# Patient Record
Sex: MALE | Race: WHITE | NOT HISPANIC OR LATINO | URBAN - METROPOLITAN AREA
[De-identification: names, ages, dates, MRNs, and addresses within clinical notes are randomized per-mention and may not be internally consistent; named-entity substitution may affect disease eponyms.]

---

## 2018-03-13 ENCOUNTER — INPATIENT (INPATIENT)
Facility: HOSPITAL | Age: 83
LOS: 2 days | Discharge: NURSING, REHAB OR LONG-TERM CARE | End: 2018-03-16
Attending: INTERNAL MEDICINE | Admitting: INTERNAL MEDICINE
Payer: MEDICARE

## 2018-03-13 VITALS
OXYGEN SATURATION: 96 % | HEART RATE: 73 BPM | RESPIRATION RATE: 18 BRPM | DIASTOLIC BLOOD PRESSURE: 53 MMHG | SYSTOLIC BLOOD PRESSURE: 101 MMHG | TEMPERATURE: 98 F

## 2018-03-13 LAB
ADD ON TEST-SPECIMEN IN LAB: SIGNIFICANT CHANGE UP
ALBUMIN SERPL ELPH-MCNC: 3 G/DL — LOW (ref 3.3–5)
ALP SERPL-CCNC: 68 U/L — SIGNIFICANT CHANGE UP (ref 40–120)
ALT FLD-CCNC: 13 U/L — SIGNIFICANT CHANGE UP (ref 12–78)
ANION GAP SERPL CALC-SCNC: 6 MMOL/L — SIGNIFICANT CHANGE UP (ref 5–17)
APPEARANCE UR: CLEAR — SIGNIFICANT CHANGE UP
APTT BLD: 32.5 SEC — SIGNIFICANT CHANGE UP (ref 27.5–37.4)
AST SERPL-CCNC: 22 U/L — SIGNIFICANT CHANGE UP (ref 15–37)
BASOPHILS # BLD AUTO: 0.09 K/UL — SIGNIFICANT CHANGE UP (ref 0–0.2)
BASOPHILS NFR BLD AUTO: 1.6 % — SIGNIFICANT CHANGE UP (ref 0–2)
BILIRUB SERPL-MCNC: 1.7 MG/DL — HIGH (ref 0.2–1.2)
BILIRUB UR-MCNC: NEGATIVE — SIGNIFICANT CHANGE UP
BUN SERPL-MCNC: 18 MG/DL — SIGNIFICANT CHANGE UP (ref 7–23)
CALCIUM SERPL-MCNC: 8.5 MG/DL — SIGNIFICANT CHANGE UP (ref 8.5–10.1)
CHLORIDE SERPL-SCNC: 102 MMOL/L — SIGNIFICANT CHANGE UP (ref 96–108)
CO2 SERPL-SCNC: 31 MMOL/L — SIGNIFICANT CHANGE UP (ref 22–31)
COLOR SPEC: YELLOW — SIGNIFICANT CHANGE UP
CREAT SERPL-MCNC: 0.8 MG/DL — SIGNIFICANT CHANGE UP (ref 0.5–1.3)
DIFF PNL FLD: (no result)
DIGOXIN SERPL-MCNC: 0.97 NG/ML — SIGNIFICANT CHANGE UP (ref 0.8–2)
EOSINOPHIL # BLD AUTO: 0.29 K/UL — SIGNIFICANT CHANGE UP (ref 0–0.5)
EOSINOPHIL NFR BLD AUTO: 5.1 % — SIGNIFICANT CHANGE UP (ref 0–6)
GLUCOSE BLDC GLUCOMTR-MCNC: 113 MG/DL — HIGH (ref 70–99)
GLUCOSE BLDC GLUCOMTR-MCNC: 62 MG/DL — LOW (ref 70–99)
GLUCOSE BLDC GLUCOMTR-MCNC: 68 MG/DL — LOW (ref 70–99)
GLUCOSE SERPL-MCNC: 92 MG/DL — SIGNIFICANT CHANGE UP (ref 70–99)
GLUCOSE UR QL: NEGATIVE MG/DL — SIGNIFICANT CHANGE UP
HCT VFR BLD CALC: 41.3 % — SIGNIFICANT CHANGE UP (ref 39–50)
HGB BLD-MCNC: 13.2 G/DL — SIGNIFICANT CHANGE UP (ref 13–17)
IMM GRANULOCYTES NFR BLD AUTO: 0.7 % — SIGNIFICANT CHANGE UP (ref 0–1.5)
INR BLD: 1.68 RATIO — HIGH (ref 0.88–1.16)
KETONES UR-MCNC: NEGATIVE — SIGNIFICANT CHANGE UP
LACTATE SERPL-SCNC: 1.6 MMOL/L — SIGNIFICANT CHANGE UP (ref 0.7–2)
LEUKOCYTE ESTERASE UR-ACNC: NEGATIVE — SIGNIFICANT CHANGE UP
LYMPHOCYTES # BLD AUTO: 1.14 K/UL — SIGNIFICANT CHANGE UP (ref 1–3.3)
LYMPHOCYTES # BLD AUTO: 20.2 % — SIGNIFICANT CHANGE UP (ref 13–44)
MAGNESIUM SERPL-MCNC: 2.1 MG/DL — SIGNIFICANT CHANGE UP (ref 1.6–2.6)
MCHC RBC-ENTMCNC: 28 PG — SIGNIFICANT CHANGE UP (ref 27–34)
MCHC RBC-ENTMCNC: 32 GM/DL — SIGNIFICANT CHANGE UP (ref 32–36)
MCV RBC AUTO: 87.7 FL — SIGNIFICANT CHANGE UP (ref 80–100)
MONOCYTES # BLD AUTO: 0.66 K/UL — SIGNIFICANT CHANGE UP (ref 0–0.9)
MONOCYTES NFR BLD AUTO: 11.7 % — SIGNIFICANT CHANGE UP (ref 2–14)
NEUTROPHILS # BLD AUTO: 3.42 K/UL — SIGNIFICANT CHANGE UP (ref 1.8–7.4)
NEUTROPHILS NFR BLD AUTO: 60.7 % — SIGNIFICANT CHANGE UP (ref 43–77)
NITRITE UR-MCNC: NEGATIVE — SIGNIFICANT CHANGE UP
NRBC # BLD: 0 /100 WBCS — SIGNIFICANT CHANGE UP (ref 0–0)
PH UR: 5 — SIGNIFICANT CHANGE UP (ref 5–8)
PLATELET # BLD AUTO: 145 K/UL — LOW (ref 150–400)
POTASSIUM SERPL-MCNC: 3.3 MMOL/L — LOW (ref 3.5–5.3)
POTASSIUM SERPL-SCNC: 3.3 MMOL/L — LOW (ref 3.5–5.3)
PROT SERPL-MCNC: 6.5 GM/DL — SIGNIFICANT CHANGE UP (ref 6–8.3)
PROT UR-MCNC: NEGATIVE MG/DL — SIGNIFICANT CHANGE UP
PROTHROM AB SERPL-ACNC: 18.3 SEC — HIGH (ref 9.8–12.7)
RBC # BLD: 4.71 M/UL — SIGNIFICANT CHANGE UP (ref 4.2–5.8)
RBC # FLD: 16.2 % — HIGH (ref 10.3–14.5)
RBC CASTS # UR COMP ASSIST: SIGNIFICANT CHANGE UP /HPF (ref 0–4)
SODIUM SERPL-SCNC: 139 MMOL/L — SIGNIFICANT CHANGE UP (ref 135–145)
SP GR SPEC: 1.01 — SIGNIFICANT CHANGE UP (ref 1.01–1.02)
UROBILINOGEN FLD QL: 1 MG/DL
WBC # BLD: 5.64 K/UL — SIGNIFICANT CHANGE UP (ref 3.8–10.5)
WBC # FLD AUTO: 5.64 K/UL — SIGNIFICANT CHANGE UP (ref 3.8–10.5)
WBC UR QL: SIGNIFICANT CHANGE UP

## 2018-03-13 PROCEDURE — 71045 X-RAY EXAM CHEST 1 VIEW: CPT | Mod: 26

## 2018-03-13 PROCEDURE — 99285 EMERGENCY DEPT VISIT HI MDM: CPT

## 2018-03-13 PROCEDURE — 93010 ELECTROCARDIOGRAM REPORT: CPT

## 2018-03-13 PROCEDURE — 73502 X-RAY EXAM HIP UNI 2-3 VIEWS: CPT | Mod: 26

## 2018-03-13 PROCEDURE — 70450 CT HEAD/BRAIN W/O DYE: CPT | Mod: 26

## 2018-03-13 RX ORDER — DEXTROSE 50 % IN WATER 50 %
25 SYRINGE (ML) INTRAVENOUS ONCE
Qty: 0 | Refills: 0 | Status: DISCONTINUED | OUTPATIENT
Start: 2018-03-13 | End: 2018-03-16

## 2018-03-13 RX ORDER — SODIUM CHLORIDE 9 MG/ML
1000 INJECTION, SOLUTION INTRAVENOUS
Qty: 0 | Refills: 0 | Status: DISCONTINUED | OUTPATIENT
Start: 2018-03-13 | End: 2018-03-16

## 2018-03-13 RX ORDER — DOCUSATE SODIUM 100 MG
100 CAPSULE ORAL THREE TIMES A DAY
Qty: 0 | Refills: 0 | Status: DISCONTINUED | OUTPATIENT
Start: 2018-03-13 | End: 2018-03-16

## 2018-03-13 RX ORDER — DEXTROSE 50 % IN WATER 50 %
25 SYRINGE (ML) INTRAVENOUS ONCE
Qty: 0 | Refills: 0 | Status: COMPLETED | OUTPATIENT
Start: 2018-03-13 | End: 2018-03-13

## 2018-03-13 RX ORDER — ONDANSETRON 8 MG/1
4 TABLET, FILM COATED ORAL EVERY 6 HOURS
Qty: 0 | Refills: 0 | Status: DISCONTINUED | OUTPATIENT
Start: 2018-03-13 | End: 2018-03-16

## 2018-03-13 RX ORDER — MAGNESIUM HYDROXIDE 400 MG/1
30 TABLET, CHEWABLE ORAL DAILY
Qty: 0 | Refills: 0 | Status: DISCONTINUED | OUTPATIENT
Start: 2018-03-13 | End: 2018-03-16

## 2018-03-13 RX ORDER — TAMSULOSIN HYDROCHLORIDE 0.4 MG/1
0.4 CAPSULE ORAL AT BEDTIME
Qty: 0 | Refills: 0 | Status: DISCONTINUED | OUTPATIENT
Start: 2018-03-13 | End: 2018-03-16

## 2018-03-13 RX ORDER — DIGOXIN 250 MCG
0.12 TABLET ORAL DAILY
Qty: 0 | Refills: 0 | Status: DISCONTINUED | OUTPATIENT
Start: 2018-03-13 | End: 2018-03-13

## 2018-03-13 RX ORDER — DONEPEZIL HYDROCHLORIDE 10 MG/1
5 TABLET, FILM COATED ORAL AT BEDTIME
Qty: 0 | Refills: 0 | Status: DISCONTINUED | OUTPATIENT
Start: 2018-03-13 | End: 2018-03-16

## 2018-03-13 RX ORDER — METOPROLOL TARTRATE 50 MG
25 TABLET ORAL DAILY
Qty: 0 | Refills: 0 | Status: DISCONTINUED | OUTPATIENT
Start: 2018-03-14 | End: 2018-03-16

## 2018-03-13 RX ORDER — DEXTROSE 50 % IN WATER 50 %
12.5 SYRINGE (ML) INTRAVENOUS ONCE
Qty: 0 | Refills: 0 | Status: DISCONTINUED | OUTPATIENT
Start: 2018-03-13 | End: 2018-03-16

## 2018-03-13 RX ORDER — ENOXAPARIN SODIUM 100 MG/ML
40 INJECTION SUBCUTANEOUS EVERY 24 HOURS
Qty: 0 | Refills: 0 | Status: DISCONTINUED | OUTPATIENT
Start: 2018-03-13 | End: 2018-03-13

## 2018-03-13 RX ORDER — SODIUM CHLORIDE 9 MG/ML
1000 INJECTION, SOLUTION INTRAVENOUS
Qty: 0 | Refills: 0 | Status: DISCONTINUED | OUTPATIENT
Start: 2018-03-13 | End: 2018-03-14

## 2018-03-13 RX ORDER — DEXTROSE 50 % IN WATER 50 %
1 SYRINGE (ML) INTRAVENOUS ONCE
Qty: 0 | Refills: 0 | Status: DISCONTINUED | OUTPATIENT
Start: 2018-03-13 | End: 2018-03-16

## 2018-03-13 RX ORDER — SODIUM CHLORIDE 9 MG/ML
500 INJECTION INTRAMUSCULAR; INTRAVENOUS; SUBCUTANEOUS
Qty: 0 | Refills: 0 | Status: COMPLETED | OUTPATIENT
Start: 2018-03-13 | End: 2018-03-13

## 2018-03-13 RX ORDER — SODIUM CHLORIDE 9 MG/ML
3 INJECTION INTRAMUSCULAR; INTRAVENOUS; SUBCUTANEOUS ONCE
Qty: 0 | Refills: 0 | Status: COMPLETED | OUTPATIENT
Start: 2018-03-13 | End: 2018-03-13

## 2018-03-13 RX ORDER — APIXABAN 2.5 MG/1
2.5 TABLET, FILM COATED ORAL EVERY 12 HOURS
Qty: 0 | Refills: 0 | Status: DISCONTINUED | OUTPATIENT
Start: 2018-03-13 | End: 2018-03-16

## 2018-03-13 RX ORDER — ACETAMINOPHEN 500 MG
650 TABLET ORAL EVERY 6 HOURS
Qty: 0 | Refills: 0 | Status: DISCONTINUED | OUTPATIENT
Start: 2018-03-13 | End: 2018-03-16

## 2018-03-13 RX ORDER — FUROSEMIDE 40 MG
40 TABLET ORAL DAILY
Qty: 0 | Refills: 0 | Status: DISCONTINUED | OUTPATIENT
Start: 2018-03-13 | End: 2018-03-16

## 2018-03-13 RX ORDER — INSULIN LISPRO 100/ML
VIAL (ML) SUBCUTANEOUS
Qty: 0 | Refills: 0 | Status: DISCONTINUED | OUTPATIENT
Start: 2018-03-13 | End: 2018-03-16

## 2018-03-13 RX ORDER — GLUCAGON INJECTION, SOLUTION 0.5 MG/.1ML
1 INJECTION, SOLUTION SUBCUTANEOUS ONCE
Qty: 0 | Refills: 0 | Status: DISCONTINUED | OUTPATIENT
Start: 2018-03-13 | End: 2018-03-16

## 2018-03-13 RX ORDER — POTASSIUM CHLORIDE 20 MEQ
20 PACKET (EA) ORAL ONCE
Qty: 0 | Refills: 0 | Status: COMPLETED | OUTPATIENT
Start: 2018-03-13 | End: 2018-03-13

## 2018-03-13 RX ORDER — DIGOXIN 250 MCG
0.12 TABLET ORAL DAILY
Qty: 0 | Refills: 0 | Status: DISCONTINUED | OUTPATIENT
Start: 2018-03-14 | End: 2018-03-16

## 2018-03-13 RX ADMIN — TAMSULOSIN HYDROCHLORIDE 0.4 MILLIGRAM(S): 0.4 CAPSULE ORAL at 22:27

## 2018-03-13 RX ADMIN — SODIUM CHLORIDE 2000 MILLILITER(S): 9 INJECTION INTRAMUSCULAR; INTRAVENOUS; SUBCUTANEOUS at 15:37

## 2018-03-13 RX ADMIN — SODIUM CHLORIDE 2000 MILLILITER(S): 9 INJECTION INTRAMUSCULAR; INTRAVENOUS; SUBCUTANEOUS at 15:07

## 2018-03-13 RX ADMIN — SODIUM CHLORIDE 2000 MILLILITER(S): 9 INJECTION INTRAMUSCULAR; INTRAVENOUS; SUBCUTANEOUS at 15:52

## 2018-03-13 RX ADMIN — Medication 25 GRAM(S): at 22:27

## 2018-03-13 RX ADMIN — DONEPEZIL HYDROCHLORIDE 5 MILLIGRAM(S): 10 TABLET, FILM COATED ORAL at 22:26

## 2018-03-13 RX ADMIN — Medication 20 MILLIEQUIVALENT(S): at 19:45

## 2018-03-13 RX ADMIN — SODIUM CHLORIDE 50 MILLILITER(S): 9 INJECTION, SOLUTION INTRAVENOUS at 22:59

## 2018-03-13 RX ADMIN — Medication 100 MILLIGRAM(S): at 22:39

## 2018-03-13 RX ADMIN — SODIUM CHLORIDE 2000 MILLILITER(S): 9 INJECTION INTRAMUSCULAR; INTRAVENOUS; SUBCUTANEOUS at 15:00

## 2018-03-13 RX ADMIN — APIXABAN 2.5 MILLIGRAM(S): 2.5 TABLET, FILM COATED ORAL at 22:26

## 2018-03-13 RX ADMIN — SODIUM CHLORIDE 3 MILLILITER(S): 9 INJECTION INTRAMUSCULAR; INTRAVENOUS; SUBCUTANEOUS at 15:00

## 2018-03-13 RX ADMIN — SODIUM CHLORIDE 2000 MILLILITER(S): 9 INJECTION INTRAMUSCULAR; INTRAVENOUS; SUBCUTANEOUS at 15:22

## 2018-03-13 NOTE — ED ADULT NURSE REASSESSMENT NOTE - NS ED NURSE REASSESS COMMENT FT1
Pt is intermittently more alert, answers questions and follows directions then becomes obtunded, responds to pain.  Hospitalist at bedside.

## 2018-03-13 NOTE — ED ADULT NURSE NOTE - CHIEF COMPLAINT QUOTE
Pt presents to ED c/o altered mental status from [facility]. BGM 99 in department. Pt fell at home on  Thursday evening, seen in [place] d/c to [facility] with a negative CT. As per EMS, pt was put to rest after a bath today around noon, where they then noticed he was altered.

## 2018-03-13 NOTE — H&P ADULT - HISTORY OF PRESENT ILLNESS
This is a 88 y/o male with a PMHx of A. fib on Eliquis w/ PPM, hx of CVA, DM, CAD, CHF and other comorbidities BIBA from [facility] for persistent altered mental status. According to wife and ED staff patient experienced a fall at home walking down basement steps 1 week ago and was hospitalized at [facility]. During that stay his Asprin / Plavix was stopped, CT scans of the brain negative for acute pathology, CT C-spine and Xrays of hands negative for acute fracture. CT Chest found with bilateral pleural effusions but otherwise patient was discharged to [facility]. Per family they noticed progressive decline in mentation and function and were initially opposed to discharge to [facility]. Upon presentation to [facility] patient did not participate in any activities and was unable to have lucid conversations. + Mild "hallucinations" at [facility] per wife. Prior to fall patient with progressive weakness at home and was minimally ambulatory dependent on wife for assistance with ADLs.     In the ED, patient very somnolent but awakens to sternal rub and noxious stimuli. He briefly responds to questions but required much probing. Labs include CBC, CMP and UA which were within normal limits.   CXR revealed mild pulmonary vascular congestion. EKG stable A. fib and on telemetry without arrhthymias Admission requested. MOLST reviewed with wife --> DNR/DNI.    ROS: unable to obtain 2/2 poor mentation.     Past Medical History:  Hx of ACS (acute coronary syndrome)    Afib on Eliquis.     BPH (benign prostatic hyperplasia)    CAD (coronary artery disease)    Diabetes    Hypertension    MI (myocardial infarction)    Pacemaker.    NKDA  Social Hx: Lives at home with wife Nancy who is primary caretaker. No ETOH / drug use. Relatively immobile, sits in chair all day at home.   Meds: reviewed.  Family Hx; noncontributory  Surgical Hx: unknown.       Vital Signs Last 24 Hrs  T(C): 36.8 (13 Mar 2018 17:01), Max: 36.8 (13 Mar 2018 17:01)  T(F): 98.2 (13 Mar 2018 17:01), Max: 98.2 (13 Mar 2018 17:01)  HR: 70 (13 Mar 2018 18:17) (70 - 97)  BP: 117/64 (13 Mar 2018 18:17) (101/53 - 122/66)  BP(mean): --  RR: 15 (13 Mar 2018 18:17) (15 - 18)  SpO2: 100% (13 Mar 2018 18:17) (96% - 100%)    PHYSICAL EXAM:  Constitutional: NAD, awake and alert, well-developed  HEENT: PERR, EOMI, Normal Hearing, MMM  Neck: Soft and supple, No LAD, No JVD  Respiratory: Breath sounds are clear bilaterally, No wheezing, rales or rhonchi  Cardiovascular: S1 and S2, regular rate and rhythm, no Murmurs, gallops or rubs  Gastrointestinal: Bowel Sounds present, soft, nontender, nondistended, no guarding, no rebound  Extremities: No peripheral edema  Vascular: 2+ peripheral pulses  Neurological: A/O x 3, no focal deficits  Musculoskeletal: 5/5 strength b/l upper and lower extremities  Skin: No rashes      MEDICATIONS  (STANDING):  docusate sodium 100 milliGRAM(s) Oral three times a day  enoxaparin Injectable 40 milliGRAM(s) SubCutaneous every 24 hours      LABS: All Labs Reviewed:                        13.2   5.64  )-----------( 145      ( 13 Mar 2018 15:06 )             41.3     03-13    139  |  102  |  18  ----------------------------<  92  3.3<L>   |  31  |  0.80    Ca    8.5      13 Mar 2018 15:06  Mg     2.1     03-13    TPro  6.5  /  Alb  3.0<L>  /  TBili  1.7<H>  /  DBili  x   /  AST  22  /  ALT  13  /  AlkPhos  68  03-13  PT/INR - ( 13 Mar 2018 15:06 )   PT: 18.3 sec;   INR: 1.68 ratio    PTT - ( 13 Mar 2018 15:06 )  PTT:32.5 sec      Blood Culture :pending     CT Head No Cont (03.13.18 @ 16:29)   No acute intracranial hemorrhage, mass effect, or midline shift.      Xray Chest 1 View AP/PA. (03.13.18 @ 15:57) >  Minimal pulmonary vascular congestion with small right pleural effusion   with right basilar atelectasis This is a 86 y/o male with a PMHx of A. fib on Eliquis w/ PPM, hx of CVA, DM, CAD, CHF and other comorbidities BIBA from [facility] for persistent altered mental status. According to wife and ED staff patient experienced a fall at home walking down basement steps 1 week ago and was hospitalized at [facility]. During that stay his Asprin / Plavix was stopped, CT scans of the brain negative for acute pathology, CT C-spine and Xrays of hands negative for acute fracture. CT Chest found with bilateral pleural effusions but otherwise patient was discharged to [facility]. Per family they noticed progressive decline in mentation and function and were initially opposed to discharge to [facility]. Upon presentation to [facility] patient did not participate in any activities and was unable to have lucid conversations. + Mild "hallucinations" at [facility] per wife. Prior to fall patient with progressive weakness at home and was minimally ambulatory dependent on wife for assistance with ADLs.     In the ED, patient very somnolent but awakens to sternal rub and noxious stimuli. He briefly responds to questions but required much probing. Labs include CBC, CMP and UA which were within normal limits.   CXR revealed mild pulmonary vascular congestion. EKG stable A. fib and on telemetry without arrhthymias Admission requested. MOLST reviewed with wife --> DNR/DNI.    ROS: unable to obtain 2/2 poor mentation.     Past Medical History:  Hx of ACS (acute coronary syndrome)    Afib on Eliquis.     BPH (benign prostatic hyperplasia)    CAD (coronary artery disease)    Diabetes    Hypertension    MI (myocardial infarction)    Pacemaker.    NKDA  Social Hx: Lives at home with wife Nancy who is primary caretaker. No ETOH / drug use. Relatively immobile, sits in chair all day at home.   Meds: reviewed.  Family Hx; noncontributory  Surgical Hx: unknown.       Vital Signs Last 24 Hrs  T(C): 36.8 (13 Mar 2018 17:01), Max: 36.8 (13 Mar 2018 17:01)  T(F): 98.2 (13 Mar 2018 17:01), Max: 98.2 (13 Mar 2018 17:01)  HR: 70 (13 Mar 2018 18:17) (70 - 97)  BP: 117/64 (13 Mar 2018 18:17) (101/53 - 122/66)  BP(mean): --  RR: 15 (13 Mar 2018 18:17) (15 - 18)  SpO2: 100% (13 Mar 2018 18:17) (96% - 100%)    PHYSICAL EXAM:  Constitutional: Elderly male appears comfortable but somnolent, opens eyes briefly to noxious stimuli and intermittently responds to questions.   HEENT: PERR, EOMI, Difficulty Hearing, MMM, poor dentition  Neck: Soft and supple, No LAD, No JVD  Respiratory: Breath sounds are clear bilaterally, No wheezing, rales or rhonchi  Cardiovascular: S1 and S2, regular rate and rhythm, no Murmurs, gallops or rubs  Gastrointestinal: Bowel Sounds present, soft, nontender, nondistended, no guarding, no rebound  Extremities: No peripheral edema  Vascular: 2+ peripheral pulses  Neurological: A/O x 2 to self and hospital, unable to perform   Musculoskeletal: 5/5 strength b/l upper and lower extremities  Skin: No rashes      MEDICATIONS  (STANDING):  docusate sodium 100 milliGRAM(s) Oral three times a day  enoxaparin Injectable 40 milliGRAM(s) SubCutaneous every 24 hours      LABS: All Labs Reviewed:                        13.2   5.64  )-----------( 145      ( 13 Mar 2018 15:06 )             41.3     03-13    139  |  102  |  18  ----------------------------<  92  3.3<L>   |  31  |  0.80    Ca    8.5      13 Mar 2018 15:06  Mg     2.1     03-13    TPro  6.5  /  Alb  3.0<L>  /  TBili  1.7<H>  /  DBili  x   /  AST  22  /  ALT  13  /  AlkPhos  68  03-13  PT/INR - ( 13 Mar 2018 15:06 )   PT: 18.3 sec;   INR: 1.68 ratio    PTT - ( 13 Mar 2018 15:06 )  PTT:32.5 sec      Blood Culture :pending     CT Head No Cont (03.13.18 @ 16:29)   No acute intracranial hemorrhage, mass effect, or midline shift.      Xray Chest 1 View AP/PA. (03.13.18 @ 15:57) >  Minimal pulmonary vascular congestion with small right pleural effusion   with right basilar atelectasis This is a 88 y/o male with a PMHx of A. fib on Eliquis w/ PPM, hx of CVA, DM, CAD, CHF and other comorbidities BIBA from [facility] for persistent altered mental status. According to wife and ED staff patient experienced a fall at home walking down basement steps 1 week ago and was hospitalized at [place]. During that stay his Asprin / Plavix was stopped, CT scans of the brain negative for acute pathology, CT C-spine and Xrays of hands negative for acute fracture. CT Chest found with bilateral pleural effusions but otherwise patient was discharged to [facility]. Per family they noticed progressive decline in mentation and function and were initially opposed to discharge to [facility]. Upon presentation to [facility] patient did not participate in any activities and was unable to have lucid conversations. + Mild "hallucinations" at [facility] per wife. Prior to fall patient with progressive weakness at home and was minimally ambulatory dependent on wife for assistance with ADLs.     In the ED, patient very somnolent but awakens to sternal rub and noxious stimuli. He briefly responds to questions but required much probing. Labs include CBC, CMP and UA which were within normal limits.   CXR revealed mild pulmonary vascular congestion. EKG stable A. fib and on telemetry without arrhthymias Admission requested. MOLST reviewed with wife --> DNR/DNI.    ROS: unable to obtain 2/2 poor mentation.     Past Medical History:  Hx of ACS (acute coronary syndrome)    Afib on Eliquis.     BPH (benign prostatic hyperplasia)    CAD (coronary artery disease)    Diabetes    Hypertension    MI (myocardial infarction)    Pacemaker.    NKDA  Social Hx: Lives at home with wife Nancy who is primary caretaker. No ETOH / drug use. Relatively immobile, sits in chair all day at home.   Meds: reviewed.  Family Hx; noncontributory  Surgical Hx: unknown.       Vital Signs Last 24 Hrs  T(C): 36.8 (13 Mar 2018 17:01), Max: 36.8 (13 Mar 2018 17:01)  T(F): 98.2 (13 Mar 2018 17:01), Max: 98.2 (13 Mar 2018 17:01)  HR: 70 (13 Mar 2018 18:17) (70 - 97)  BP: 117/64 (13 Mar 2018 18:17) (101/53 - 122/66)  BP(mean): --  RR: 15 (13 Mar 2018 18:17) (15 - 18)  SpO2: 100% (13 Mar 2018 18:17) (96% - 100%)    PHYSICAL EXAM:  Constitutional: Elderly male appears comfortable but somnolent, opens eyes briefly to noxious stimuli and intermittently responds to questions.   HEENT: PERR, EOMI, Difficulty Hearing, MMM, poor dentition  Neck: Soft and supple, No LAD, No JVD  Respiratory: Breath sounds are clear bilaterally, No wheezing, rales or rhonchi  Cardiovascular: S1 and S2, regular rate and rhythm, no Murmurs, gallops or rubs  Gastrointestinal: Bowel Sounds present, soft, nontender, nondistended, no guarding, no rebound  Extremities: No peripheral edema  Vascular: 2+ peripheral pulses  Neurological: A/O x 2 to self and hospital, unable to perform   Musculoskeletal: unable to test full muscle strength although hand  intact, wiggles toes.   Skin: No rashes      MEDICATIONS  (STANDING):  docusate sodium 100 milliGRAM(s) Oral three times a day  enoxaparin Injectable 40 milliGRAM(s) SubCutaneous every 24 hours      LABS: All Labs Reviewed:                        13.2   5.64  )-----------( 145      ( 13 Mar 2018 15:06 )             41.3     03-13    139  |  102  |  18  ----------------------------<  92  3.3<L>   |  31  |  0.80    Ca    8.5      13 Mar 2018 15:06  Mg     2.1     03-13    TPro  6.5  /  Alb  3.0<L>  /  TBili  1.7<H>  /  DBili  x   /  AST  22  /  ALT  13  /  AlkPhos  68  03-13  PT/INR - ( 13 Mar 2018 15:06 )   PT: 18.3 sec;   INR: 1.68 ratio    PTT - ( 13 Mar 2018 15:06 )  PTT:32.5 sec      Blood Culture :pending     CT Head No Cont (03.13.18 @ 16:29)   No acute intracranial hemorrhage, mass effect, or midline shift.      Xray Chest 1 View AP/PA. (03.13.18 @ 15:57) >  Minimal pulmonary vascular congestion with small right pleural effusion   with right basilar atelectasis      ASSESSMENT AND PLAN:  This is a 88 y/o male with a PMHx of A. fib on Eliquis w/ PPM, hx of CVA, DM, CAD, CHF and other comorbidities BIBA from [facility] for persistent altered mental status.     # Acute On chronic Encephalopathy - suspect metabolic encephalopathy in the setting of suspected Dementia (wife w/ concerns of underlying dementia not officially diagnosed)  - cannot r/o CVA, has PPM, unable to obtain MRI. Stable Head CT.   - possible post concussive syndrome.   - possible hypoactive delirium in patient w/ suspected underlying dementia.   - unlikely seizure given no hx of seizure however check EEG.   - check VBG r/o CO2 retention.   - check Digoxin level.   - check TSH level.   - no signs of infection / renal failure / electrolyte abnormalities.   - no new meds: will hold Gabapentin which can be sedative.   - neuro checks q8hrs  - fall precautions / elevate head of bed >30 degrees to decrease risk of aspiration.  - consult Neurology for further recs. If no further improvement will need Palliative Care input.   - PT consult if mobile.     # Hx of A. fib - continue Eliquis, currently rate controlled, has PPM.   - cont Metoprolol.   - no signs of ischemia on telemetry in ED / EKG.     # Mild Pulmonary Edema - resume home Lasix. Wean off O2.     # Hx of CVA - Aspirin / Plavix stopped during prior admission at [facility].     # BPH - continue Flomax QHS.     # DM Type II - holding Metformin, start SSI AC   - check A1c.       DVT ppx: on Eliquis.   Dispo: admit to med -surg.   Total time > 75 mins.     Code Status: DNR/ DNI, case discussed w/ wife NancyRamona NOBLE in chart. Additional 18mins spent.

## 2018-03-13 NOTE — ED ADULT NURSE REASSESSMENT NOTE - COMFORT CARE
hourly rounding completed/repositioned/side rails up
repositioned/side rails up/assisted in cleaning and changing patient brief. patient is clean and dry at this time. hourly rounding completed
side rails up/assisted in cleaning and changing patient brief. patient is clean and dry at this time. hourly rounding completed/assisted to bathroom/repositioned

## 2018-03-13 NOTE — ED ADULT TRIAGE NOTE - CHIEF COMPLAINT QUOTE
Pt presents to ED c/o altered mental status from [facility]. BGM 99 in department. Pt fell at home on  Thursday evening, seen in [place] d/c to [facility] with a negative CT. As per EMS, pt was put to rest after a bath today around noon, where they then noticed he was altered.

## 2018-03-13 NOTE — PATIENT PROFILE ADULT. - NS PRO AD PATIENT TYPE
Do Not Resuscitate (DNR) Do Not Resuscitate (DNR)/Medical Orders for Life-Sustaining Treatment (MOLST)

## 2018-03-13 NOTE — ED ADULT NURSE REASSESSMENT NOTE - GENERAL PATIENT STATE
comfortable appearance/resting/sleeping/cooperative/family/SO at bedside
resting/sleeping/comfortable appearance/cooperative
cooperative/comfortable appearance/resting/sleeping

## 2018-03-13 NOTE — ED ADULT NURSE NOTE - OBJECTIVE STATEMENT
Per family pt fell down flight of stairs 5 days ago.  Pt was taken to [facility], discharged yesterday and taken to [facility] for rehab.  Pt became obtunded after shower this morning, sent to  Per family pt was conversational for two days and has had steady mental decline since.  Per family, pt has hx of dementia but "never this bad".  Pt responds to pain, will mumble words intermittently.  vss as charted.

## 2018-03-13 NOTE — ED PROVIDER NOTE - UNABLE TO OBTAIN
Unable to obtain a complete history secondary to pt's condition. Severe Illness/Injury Unable to obtain a complete ROS secondary to pt's condition.

## 2018-03-13 NOTE — ED PROVIDER NOTE - PMH
ACS (acute coronary syndrome)    Afib    BPH (benign prostatic hyperplasia)    CAD (coronary artery disease)    Diabetes    Hypertension    MI (myocardial infarction)    Pacemaker

## 2018-03-13 NOTE — ED PROVIDER NOTE - OBJECTIVE STATEMENT
86 y/o male with a PMHx of ACS, afib s/p pacemaker, BPH, CAD, DM, HTN, MI presents to the ED regarding worsening AMS x2 days s/p fall. 2 days ago, pt had a mechanical fall at 3:00am down 14 steps and was seen at [facility]. At the time, pt was responsive and admitted. Pt had no fractures. 2 days ago, began to have delusions as per family. Yesterday, pt was discharged to go to [facility] rehab and able to have conversations. This morning, pt became unresponsive. Pt currently too weak to walk, dizzy. Unable to obtain a complete history secondary to pt's condition.

## 2018-03-14 LAB
ANION GAP SERPL CALC-SCNC: 8 MMOL/L — SIGNIFICANT CHANGE UP (ref 5–17)
BUN SERPL-MCNC: 15 MG/DL — SIGNIFICANT CHANGE UP (ref 7–23)
CALCIUM SERPL-MCNC: 8.2 MG/DL — LOW (ref 8.5–10.1)
CHLORIDE SERPL-SCNC: 105 MMOL/L — SIGNIFICANT CHANGE UP (ref 96–108)
CO2 SERPL-SCNC: 26 MMOL/L — SIGNIFICANT CHANGE UP (ref 22–31)
CREAT SERPL-MCNC: 0.67 MG/DL — SIGNIFICANT CHANGE UP (ref 0.5–1.3)
CRP SERPL-MCNC: 2.1 MG/DL — HIGH (ref 0–0.4)
CULTURE RESULTS: NO GROWTH — SIGNIFICANT CHANGE UP
ERYTHROCYTE [SEDIMENTATION RATE] IN BLOOD: 5 MM/HR — SIGNIFICANT CHANGE UP (ref 0–20)
FOLATE SERPL-MCNC: 11.4 NG/ML — SIGNIFICANT CHANGE UP (ref 4.8–24.2)
GLUCOSE BLDC GLUCOMTR-MCNC: 110 MG/DL — HIGH (ref 70–99)
GLUCOSE BLDC GLUCOMTR-MCNC: 134 MG/DL — HIGH (ref 70–99)
GLUCOSE BLDC GLUCOMTR-MCNC: 137 MG/DL — HIGH (ref 70–99)
GLUCOSE SERPL-MCNC: 111 MG/DL — HIGH (ref 70–99)
HBA1C BLD-MCNC: 5.8 % — HIGH (ref 4–5.6)
POTASSIUM SERPL-MCNC: 3.3 MMOL/L — LOW (ref 3.5–5.3)
POTASSIUM SERPL-SCNC: 3.3 MMOL/L — LOW (ref 3.5–5.3)
SODIUM SERPL-SCNC: 139 MMOL/L — SIGNIFICANT CHANGE UP (ref 135–145)
SPECIMEN SOURCE: SIGNIFICANT CHANGE UP
T3 SERPL-MCNC: 66 NG/DL — LOW (ref 80–200)
TSH SERPL-MCNC: 1.31 UU/ML — SIGNIFICANT CHANGE UP (ref 0.36–3.74)
VIT B12 SERPL-MCNC: 831 PG/ML — SIGNIFICANT CHANGE UP (ref 232–1245)

## 2018-03-14 PROCEDURE — 73030 X-RAY EXAM OF SHOULDER: CPT | Mod: 26,RT

## 2018-03-14 PROCEDURE — 99223 1ST HOSP IP/OBS HIGH 75: CPT

## 2018-03-14 PROCEDURE — 95819 EEG AWAKE AND ASLEEP: CPT | Mod: 26

## 2018-03-14 RX ORDER — POTASSIUM CHLORIDE 20 MEQ
40 PACKET (EA) ORAL ONCE
Qty: 0 | Refills: 0 | Status: COMPLETED | OUTPATIENT
Start: 2018-03-14 | End: 2018-03-14

## 2018-03-14 RX ORDER — POTASSIUM CHLORIDE 20 MEQ
40 PACKET (EA) ORAL EVERY 4 HOURS
Qty: 0 | Refills: 0 | Status: DISCONTINUED | OUTPATIENT
Start: 2018-03-14 | End: 2018-03-14

## 2018-03-14 RX ADMIN — APIXABAN 2.5 MILLIGRAM(S): 2.5 TABLET, FILM COATED ORAL at 05:55

## 2018-03-14 RX ADMIN — APIXABAN 2.5 MILLIGRAM(S): 2.5 TABLET, FILM COATED ORAL at 17:29

## 2018-03-14 RX ADMIN — Medication 100 MILLIGRAM(S): at 21:12

## 2018-03-14 RX ADMIN — Medication 40 MILLIGRAM(S): at 05:55

## 2018-03-14 RX ADMIN — Medication 25 MILLIGRAM(S): at 05:55

## 2018-03-14 RX ADMIN — TAMSULOSIN HYDROCHLORIDE 0.4 MILLIGRAM(S): 0.4 CAPSULE ORAL at 21:12

## 2018-03-14 RX ADMIN — Medication 0.12 MILLIGRAM(S): at 05:55

## 2018-03-14 RX ADMIN — DONEPEZIL HYDROCHLORIDE 5 MILLIGRAM(S): 10 TABLET, FILM COATED ORAL at 21:11

## 2018-03-14 RX ADMIN — Medication 40 MILLIEQUIVALENT(S): at 17:26

## 2018-03-14 NOTE — PROGRESS NOTE ADULT - SUBJECTIVE AND OBJECTIVE BOX
· Subjective and Objective:   Patient is a 87y old  Male with pmhX of CAD, MI, Afib on Eliquis, pacemaker, HTN,     DM,and BPH and recent hospitalization at [facility] who was BIBA from [facility] for worsening mental status and mild hallucinations.     Patient is status post fall from 1 week ago 3/8/18 down a flight of steps.  WAS     hospitalized at [facility] where Asprin / Plavix was stopped, CT scans of the     brain negative for acute pathology, CT C-spine and Xrays of hands negative for     acute fracture. CT Chest found with bilateral pleural effusions but otherwise     patient was discharged to [facility]. Per family they noticed progressive decline in     mentation and function and were initially opposed to discharge to [facility]. Upon     presentation to [facility] patient did not participate in any activities and was     unable to have lucid conversations. + Mild "hallucinations" at [facility] per wife.     Prior to fall patient with progressive weakness at home and was minimally     ambulatory dependent on wife for assistance with ADLs. In the ED, patient very     somnolent but awakens to sternal rub and noxious stimuli. He briefly responds to     questions but required much probing. Labs include CBC, CMP and UA which were     within normal limits. CXR revealed mild pulmonary vascular congestion. EKG stable     A. fib and on telemetry without arrhthymias Admission requested. MOLST reviewed     with wife --> DNR/DNI.  Noted to have tremors when asked he says he was seen by Neurologist dx as benign     tremors takes medication for it doesn't know what it is and not PD . H/o cardiac     problems.     Hospital course:  03/15: On [facility] patient seen A&ox3.  patient states he feels weak and tired. d/c     d5W iv fluids.  Neuro consult ordered EEG. Spoke with spouse today as per wife     patient has been exhibiting signs of gradual decline in ADL's "strength and     appetite" and mentation "feeling more sleepy" for the past 2 months possibly     longer with multiple episodes of falls.      objective:    Vital Signs Last 24 Hrs  T(C): 36.8 (13 Mar 2018 17:01), Max: 36.8 (13 Mar 2018 17:01)  T(F): 98.2 (13 Mar 2018 17:01), Max: 98.2 (13 Mar 2018 17:01)  HR: 70 (13 Mar 2018 18:17) (70 - 97)  BP: 117/64 (13 Mar 2018 18:17) (101/53 - 122/66)  BP(mean): --  RR: 15 (13 Mar 2018 18:17) (15 - 18)  SpO2: 100% (13 Mar 2018 18:17) (96% - 100%)    PHYSICAL EXAM:  GEN: up in chair, NAD  HEENT: NC/AT, pupils equal and reactive, EOMI  CV:  +S1, +S2, RRR  RESP:   lungs clear to auscultation bilaterally, no wheeze, rales, rhonchi   GI:  abdomen soft, non-tender, non-distended, normoactive BS  RECTAL:  not examined  :  not examined  MSK:   normal muscle tone  EXT:  no edema  NEURO:  AAOX3, no focal neurological deficits. perrla 2mm. Patient weak muscle     strength but equal bilaterally. Appears drowsy.   SKIN:  no rashes        labs:             13.2   5.64  )-----------( 145      ( 13 Mar 2018 15:06 )             41.3     03-14    139  |  105  |  15  ----------------------------<  111<H>  3.3<L>   |  26  |  0.67    Ca    8.2<L>      14 Mar 2018 06:27  Mg     2.1     03-13    TPro  6.5  /  Alb  3.0<L>  /  TBili  1.7<H>  /  DBili  x   /  AST  22  /  ALT  13      /  AlkPhos  68  03-13        LIVER FUNCTIONS - ( 13 Mar 2018 15:06 )  Alb: 3.0 g/dL / Pro: 6.5 gm/dL / ALK PHOS: 68 U/L / ALT: 13 U/L / AST: 22 U/L /     GGT: x           PT/INR - ( 13 Mar 2018 15:06 )   PT: 18.3 sec;   INR: 1.68 ratio         PTT - ( 13 Mar 2018 15:06 )  PTT:32.5 sec  Urinalysis Basic - ( 13 Mar 2018 15:06 )    Color: Yellow / Appearance: Clear / S.010 / pH: x  Gluc: x / Ketone: Negative  / Bili: Negative / Urobili: 1 mg/dL   Blood: x / Protein: Negative mg/dL / Nitrite: Negative   Leuk Esterase: Negative / RBC: 0-2 /HPF / WBC 0-2   Sq Epi: x / Non Sq Epi: x / Bacteria: x        Lactate, Blood: 1.6 mmol/L ( @ 15:06)    Radiology:    CT head no contrast                          PROCEDURE DATE:  2018    INTERPRETATION:  CT BRAIN  HISTORY:  Altered mental status    TECHNIQUE: CT of the head was performed without intravenous contrast.   Multiplanar reformatted images were then generated from the axial   acquired data.  This study was performed using automatic exposure control   (radiation dose reduction software) to obtain a diagnostic image quality   scan with patient dose as low as reasonably achievable.    COMPARISON: Head CT 2/3/2015    FINDINGS:     INTRACRANIAL FINDINGS: There is age-related atrophy and chronic   microvascular ischemic change. No evidence for acute territorial infarct,   mass lesion, mass effect, or recent hemorrhage. There is no abnormal   extra axial collection.    EXTRACRANIAL FINDINGS: No extracalvarial soft tissue swelling. No   depressed calvarial fracture. The orbital contents are unremarkable. The   visualized paranasal sinuses are well aerated. The mastoid air cells are   clear.    IMPRESSION:     No acute intracranial hemorrhage, mass effect, or midline shift.           Assessment and Recommendation:     Assessment:	  Patient is a 87y old  Male with pmhX of CAD, MI, Afib on Eliquis, pacemaker, HTN,     DM,and BPH and recent hospitalization at [facility] who was BIBA from [facility] for worsening mental status and mild hallucinations.    Problem/plan:    #1 Acute On chronic Encephalopathy - suspect metabolic encephalopathy in the     setting of suspected Dementia  - R/o CVA 3/13 CT scan unremarkable  - As per neuro (Repeat CT brain?, EEG)  - TSH WNL  - dig lvl wnl  - will check b12/folate lvls  - neruo checks Q 8 hours  - will monitor bmp and replace electroyltes as needed.  - fall precautions  - PT consult appreciated      #2 Hx of A. fib  - continue Eliquis, currently rate controlled, has PPM.   - cont Metoprolol.   - cont. digoxin    #3 Mild Pulmonary Edema on x-ray  - resume home Lasix.  - 02 100% on RA no dysnea      #4 Hx of CVA   - Aspirin / Plavix stopped during prior admission at [facility] will keep off for     risk of bleed.     #5 BPH   - continue Flomax QHS.     #6 DM Type II   - holding Metformin,   - on sliding scale  - A1c. 5.8    #7 DVT ppx:   - on Eliquis.     # Code Status: DNR/ DNI, case discussed w/ wife Nancy. REAL in chart. Additional     18mins spent. · Subjective and Objective:   Patient is a 87y old  Male with pmhX of CAD, MI, Afib on Eliquis, pacemaker, HTN,     DM,and BPH and recent hospitalization at [facility] who was BIBA from [facility] for worsening mental status and mild hallucinations.     Patient is status post fall from 1 week ago 3/8/18 down a flight of steps.      Was hospitalized at [facility] where Asprin / Plavix was stopped, CT scans of     the brain negative for acute pathology, CT C-spine and Xrays of hands negative for     acute fracture. CT Chest found with bilateral pleural effusions but otherwise     patient was discharged to [facility]. Per family they noticed progressive decline in     mentation and function and were initially opposed to discharge to [facility]. Upon     presentation to [facility] patient did not participate in any activities and was     unable to have lucid conversations. + Mild "hallucinations" at [facility] per wife.     Prior to fall patient has been exhibiting signs of gradual decline in ADL's     "strength and appetite" and mentation "feeling more sleepy" for the past 2 months     possibly longer with multiple episodes of falls.        Hospital course:  : location ED. Noted to have tremors when asked he says he was seen by     Neurologist dx as benign tremors takes medication for it doesn't know what it is     and not PD.  03/15: location [facility]. Patient states he feels weak and tired. +     shoulder pain x-ray ordered. d/c d5W iv fluids.  Neuro consult ordered EEG. As per     spouse today patient has been exhibiting signs of gradual decline in ADL's and     mentation for the past 2 months. D/c donepezil as per spouse and pharmacy     reconciliation this is not a home medication.      objective:    Vital Signs Last 24 Hrs  T(C): 36.8 (13 Mar 2018 17:01), Max: 36.8 (13 Mar 2018 17:01)  T(F): 98.2 (13 Mar 2018 17:01), Max: 98.2 (13 Mar 2018 17:01)  HR: 70 (13 Mar 2018 18:17) (70 - 97)  BP: 117/64 (13 Mar 2018 18:17) (101/53 - 122/66)  BP(mean): --  RR: 15 (13 Mar 2018 18:17) (15 - 18)  SpO2: 100% (13 Mar 2018 18:17) (96% - 100%)    PHYSICAL EXAM:  GEN: up in chair, + shoulder pain, unable to fully mobilize arm.   HEENT: NC/AT, pupils equal and reactive, EOMI  CV:  +S1, +S2, RRR  RESP:   lungs clear to auscultation bilaterally, no wheeze, rales, rhonchi   GI:  abdomen soft, non-tender, non-distended, normoactive BS  RECTAL:  not examined  :  not examined  MSK:   normal muscle tone  EXT:  no edema  NEURO:  AAOX3, no focal neurological deficits. perrla 2mm. Patient weak muscle     strength but equal bilaterally. Appears drowsy.   SKIN:  no rashes        labs:             13.2   5.64  )-----------( 145      ( 13 Mar 2018 15:06 )             41.3     14    139  |  105  |  15  ----------------------------<  111<H>  3.3<L>   |  26  |  0.67    Ca    8.2<L>      14 Mar 2018 06:27  Mg     2.1         TPro  6.5  /  Alb  3.0<L>  /  TBili  1.7<H>  /  DBili  x   /  AST  22  /  ALT  13      /  AlkPhos  68  13        LIVER FUNCTIONS - ( 13 Mar 2018 15:06 )  Alb: 3.0 g/dL / Pro: 6.5 gm/dL / ALK PHOS: 68 U/L / ALT: 13 U/L / AST: 22 U/L /     GGT: x           PT/INR - ( 13 Mar 2018 15:06 )   PT: 18.3 sec;   INR: 1.68 ratio         PTT - ( 13 Mar 2018 15:06 )  PTT:32.5 sec  Urinalysis Basic - ( 13 Mar 2018 15:06 )    Color: Yellow / Appearance: Clear / S.010 / pH: x  Gluc: x / Ketone: Negative  / Bili: Negative / Urobili: 1 mg/dL   Blood: x / Protein: Negative mg/dL / Nitrite: Negative   Leuk Esterase: Negative / RBC: 0-2 /HPF / WBC 0-2   Sq Epi: x / Non Sq Epi: x / Bacteria: x        Lactate, Blood: 1.6 mmol/L ( @ 15:06)    Radiology:    CT head no contrast                          PROCEDURE DATE:  2018    INTERPRETATION:  CT BRAIN  HISTORY:  Altered mental status    TECHNIQUE: CT of the head was performed without intravenous contrast.   Multiplanar reformatted images were then generated from the axial   acquired data.  This study was performed using automatic exposure control   (radiation dose reduction software) to obtain a diagnostic image quality   scan with patient dose as low as reasonably achievable.    COMPARISON: Head CT 2/3/2015    FINDINGS:     INTRACRANIAL FINDINGS: There is age-related atrophy and chronic   microvascular ischemic change. No evidence for acute territorial infarct,   mass lesion, mass effect, or recent hemorrhage. There is no abnormal   extra axial collection.    EXTRACRANIAL FINDINGS: No extracalvarial soft tissue swelling. No   depressed calvarial fracture. The orbital contents are unremarkable. The   visualized paranasal sinuses are well aerated. The mastoid air cells are   clear.    IMPRESSION:     No acute intracranial hemorrhage, mass effect, or midline shift.           Assessment and Recommendation:     Assessment:	  Patient is a 87y old  Male with pmhX of CAD, MI, Afib on Eliquis, pacemaker, HTN,     DM,and BPH and recent hospitalization at [facility] who was BIBA from [facility] for worsening mental status and mild hallucinations.    Problem/plan:    #1 Acute On chronic Encephalopathy - suspect metabolic encephalopathy in the     setting of suspected Dementia  - R/o CVA 3/13 CT scan unremarkable  - As per neuro (Repeat CT brain?, EEG)  - TSH WNL  - dig lvl wnl  - will check b12/folate lvls  - neruo checks Q 8 hours  - will monitor bmp and replace electroyltes as needed.  - fall precautions  - PT consult appreciated    #2 Hx of A. fib  - continue Eliquis, currently rate controlled, has PPM.   - cont Metoprolol.   - cont. digoxin    #3 Mild Pulmonary Edema on x-ray  - resume home Lasix.  - 02 100% on RA no dysnea      #4 Hx of CVA   - Aspirin / Plavix stopped during prior admission at [facility] will keep off for     risk of bleed.     #5 BPH   - continue Flomax QHS.     #6 DM Type II   - holding Metformin,   - on sliding scale  - A1c. 5.8    #7 DVT ppx:   - on Eliquis.     #8 shoulder pain (unable to fully mobilize arm)  - x-ray of shoulder  - C-rp and ESR ordered    # Code Status: DNR/ DNI, case discussed w/ wife Logan. MOLST in chart. Additional     18mins spent. · Subjective and Objective:   Patient is a 87y old  Male with pmhX of CAD, MI, Afib on Eliquis, pacemaker, HTN,     DM,and BPH and recent hospitalization at [facility] who was BIBA from [facility] for worsening mental status and mild hallucinations.     Patient is status post fall from 1 week ago 3/8/18 down a flight of steps.      Was hospitalized at [facility] where Asprin / Plavix was stopped, CT scans of     the brain negative for acute pathology, CT C-spine and Xrays of hands negative for     acute fracture. CT Chest found with bilateral pleural effusions but otherwise     patient was discharged to [facility]. Per family they noticed progressive decline in     mentation and function and were initially opposed to discharge to [facility]. Upon     presentation to [facility] patient did not participate in any activities and was     unable to have lucid conversations. + Mild "hallucinations" at [facility] per wife.     Prior to fall patient has been exhibiting signs of gradual decline in ADL's     "strength and appetite" and mentation "feeling more sleepy" for the past 2 months     possibly longer with multiple episodes of falls.        Hospital course:  : location ED. Noted to have tremors when asked he says he was seen by     Neurologist dx as benign tremors takes medication for it doesn't know what it is     and not PD.  : location [facility]. Patient states he feels weak and tired. +     shoulder pain.   As per   spouse today patient has been exhibiting signs of gradual decline in ADL's and     mentation for the past 2 months.     objective:    Vital Signs Last 24 Hrs  T(C): 36.8 (13 Mar 2018 17:01), Max: 36.8 (13 Mar 2018 17:01)  T(F): 98.2 (13 Mar 2018 17:01), Max: 98.2 (13 Mar 2018 17:01)  HR: 70 (13 Mar 2018 18:17) (70 - 97)  BP: 117/64 (13 Mar 2018 18:17) (101/53 - 122/66)  BP(mean): --  RR: 15 (13 Mar 2018 18:17) (15 - 18)  SpO2: 100% (13 Mar 2018 18:17) (96% - 100%)    PHYSICAL EXAM:  GEN: up in chair, + shoulder pain, unable to fully mobilize arm.   HEENT: NC/AT, pupils equal and reactive, EOMI  CV:  +S1, +S2, RRR  RESP:   lungs clear to auscultation bilaterally, no wheeze, rales, rhonchi   GI:  abdomen soft, non-tender, non-distended, normoactive BS  RECTAL:  not examined  :  not examined  MSK:   normal muscle tone  EXT:  no edema  NEURO:  AAOX3, no focal neurological deficits. muscle   strength but equal bilaterally. somonolent  SKIN:  no rashes        labs:             13.2   5.64  )-----------( 145      ( 13 Mar 2018 15:06 )             41.3     -    139  |  105  |  15  ----------------------------<  111<H>  3.3<L>   |  26  |  0.67    Ca    8.2<L>      14 Mar 2018 06:27  Mg     2.1         TPro  6.5  /  Alb  3.0<L>  /  TBili  1.7<H>  /  DBili  x   /  AST  22  /  ALT  13      /  AlkPhos  68  -        LIVER FUNCTIONS - ( 13 Mar 2018 15:06 )  Alb: 3.0 g/dL / Pro: 6.5 gm/dL / ALK PHOS: 68 U/L / ALT: 13 U/L / AST: 22 U/L /     GGT: x           PT/INR - ( 13 Mar 2018 15:06 )   PT: 18.3 sec;   INR: 1.68 ratio         PTT - ( 13 Mar 2018 15:06 )  PTT:32.5 sec  Urinalysis Basic - ( 13 Mar 2018 15:06 )    Color: Yellow / Appearance: Clear / S.010 / pH: x  Gluc: x / Ketone: Negative  / Bili: Negative / Urobili: 1 mg/dL   Blood: x / Protein: Negative mg/dL / Nitrite: Negative   Leuk Esterase: Negative / RBC: 0-2 /HPF / WBC 0-2   Sq Epi: x / Non Sq Epi: x / Bacteria: x        Lactate, Blood: 1.6 mmol/L ( @ 15:06)    Radiology:    CT head no contrast                          PROCEDURE DATE:  2018    INTERPRETATION:  CT BRAIN  HISTORY:  Altered mental status    TECHNIQUE: CT of the head was performed without intravenous contrast.   Multiplanar reformatted images were then generated from the axial   acquired data.  This study was performed using automatic exposure control   (radiation dose reduction software) to obtain a diagnostic image quality   scan with patient dose as low as reasonably achievable.    COMPARISON: Head CT 2/3/2015    FINDINGS:     INTRACRANIAL FINDINGS: There is age-related atrophy and chronic   microvascular ischemic change. No evidence for acute territorial infarct,   mass lesion, mass effect, or recent hemorrhage. There is no abnormal   extra axial collection.    EXTRACRANIAL FINDINGS: No extracalvarial soft tissue swelling. No   depressed calvarial fracture. The orbital contents are unremarkable. The   visualized paranasal sinuses are well aerated. The mastoid air cells are   clear.    IMPRESSION:     No acute intracranial hemorrhage, mass effect, or midline shift.           Assessment and Recommendation:     Assessment:	  Patient is a 87y old  Male with pmhX of CAD, MI, Afib on Eliquis, pacemaker, HTN,     DM,and BPH and recent hospitalization at [facility] who was BIBA from [facility] for worsening mental status and mild hallucinations.    Problem/plan:    #1 Acute On chronic Encephalopathy - suspect metabolic encephalopathy in the     setting of suspected Dementia  - R/o CVA 3/13 CT scan unremarkable  - As per neuro - Repeat CT brain 3/15, EEG - negative   - TSH WNL  - dig lvl wnl  - will check b12/folate lvls  - ESR, CRP  - normal.  -  checkANA   - neuro checks Q 8 hours  - will monitor bmp and replace electroyltes as needed.  - fall precautions  - PT consult appreciated    #2 Hx of A. fib  - continue Eliquis, currently rate controlled, has PPM.   - cont Metoprolol.   - cont. digoxin    #3 Mild Pulmonary Edema on x-ray   - resume home Lasix.  - 02 100% on RA no dyspnea      #4 Hx of CVA   - Aspirin / Plavix stopped during prior admission at [facility] will keep off for     risk of bleed.   - continue eliquis and repeat CTH tomorrow    #5 BPH   - continue Flomax QHS.     #6 DM Type II   - holding Metformin,   - on sliding scale  - A1c. 5.8    #7 DVT ppx:   - on Eliquis.     #8 shoulder pain (unable to fully mobilize arm)  - x-ray of shoulder - negative     # Code Status: DNR/ DNI, case discussed w/ wife Nancy. REAL in chart. Additional     18mins spent. · Subjective and Objective:   Patient is a 87y old  Male with pmhX of CAD, MI, Afib on Eliquis, pacemaker, HTN,     DM,and BPH and recent hospitalization at [facility] who was BIBA from [facility] for worsening mental status and mild hallucinations.     Patient is status post fall from 1 week ago 3/8/18 down a flight of steps.      Was hospitalized at [facility] where Asprin / Plavix was stopped, CT scans of     the brain negative for acute pathology, CT C-spine and Xrays of hands negative for     acute fracture. CT Chest found with bilateral pleural effusions but otherwise     patient was discharged to [facility]. Per family they noticed progressive decline in     mentation and function and were initially opposed to discharge to [facility]. Upon     presentation to [facility] patient did not participate in any activities and was     unable to have lucid conversations. + Mild "hallucinations" at [facility] per wife.     Prior to fall patient has been exhibiting signs of gradual decline in ADL's     "strength and appetite" and mentation "feeling more sleepy" for the past 2 months     possibly longer with multiple episodes of falls.        Hospital course:  : location ED. Noted to have tremors when asked he says he was seen by     Neurologist dx as benign tremors takes medication for it doesn't know what it is     and not PD.  : location [facility]. Patient states he feels weak and tired. +     shoulder pain.   As per   spouse today patient has been exhibiting signs of gradual decline in ADL's and     mentation for the past 2 months.     objective:    Vital Signs Last 24 Hrs  T(C): 36.8 (13 Mar 2018 17:01), Max: 36.8 (13 Mar 2018 17:01)  T(F): 98.2 (13 Mar 2018 17:01), Max: 98.2 (13 Mar 2018 17:01)  HR: 70 (13 Mar 2018 18:17) (70 - 97)  BP: 117/64 (13 Mar 2018 18:17) (101/53 - 122/66)  BP(mean): --  RR: 15 (13 Mar 2018 18:17) (15 - 18)  SpO2: 100% (13 Mar 2018 18:17) (96% - 100%)    PHYSICAL EXAM:  GEN: up in chair, comfortable  HEENT: NC/AT, pupils equal and reactive, EOMI  CV:  +S1, +S2, RRR  RESP:   lungs clear to auscultation bilaterally, no wheeze, rales, rhonchi   GI:  abdomen soft, non-tender, non-distended, normoactive BS  RECTAL:  not examined  :  not examined  MSK:   normal muscle tone  EXT:  no LE edema,+ shoulder pain, unable to fully mobilize arm.   NEURO:  AAOX3, no focal neurological deficits. muscle   strength but equal bilaterally. somonolent  SKIN:  no rashes        labs:             13.2   5.64  )-----------( 145      ( 13 Mar 2018 15:06 )             41.3     -    139  |  105  |  15  ----------------------------<  111<H>  3.3<L>   |  26  |  0.67    Ca    8.2<L>      14 Mar 2018 06:27  Mg     2.1         TPro  6.5  /  Alb  3.0<L>  /  TBili  1.7<H>  /  DBili  x   /  AST  22  /  ALT  13      /  AlkPhos  68  -        LIVER FUNCTIONS - ( 13 Mar 2018 15:06 )  Alb: 3.0 g/dL / Pro: 6.5 gm/dL / ALK PHOS: 68 U/L / ALT: 13 U/L / AST: 22 U/L /     GGT: x           PT/INR - ( 13 Mar 2018 15:06 )   PT: 18.3 sec;   INR: 1.68 ratio         PTT - ( 13 Mar 2018 15:06 )  PTT:32.5 sec  Urinalysis Basic - ( 13 Mar 2018 15:06 )    Color: Yellow / Appearance: Clear / S.010 / pH: x  Gluc: x / Ketone: Negative  / Bili: Negative / Urobili: 1 mg/dL   Blood: x / Protein: Negative mg/dL / Nitrite: Negative   Leuk Esterase: Negative / RBC: 0-2 /HPF / WBC 0-2   Sq Epi: x / Non Sq Epi: x / Bacteria: x        Lactate, Blood: 1.6 mmol/L ( @ 15:06)    Radiology:    CT head no contrast                          PROCEDURE DATE:  2018    INTERPRETATION:  CT BRAIN  HISTORY:  Altered mental status    TECHNIQUE: CT of the head was performed without intravenous contrast.   Multiplanar reformatted images were then generated from the axial   acquired data.  This study was performed using automatic exposure control   (radiation dose reduction software) to obtain a diagnostic image quality   scan with patient dose as low as reasonably achievable.    COMPARISON: Head CT 2/3/2015    FINDINGS:     INTRACRANIAL FINDINGS: There is age-related atrophy and chronic   microvascular ischemic change. No evidence for acute territorial infarct,   mass lesion, mass effect, or recent hemorrhage. There is no abnormal   extra axial collection.    EXTRACRANIAL FINDINGS: No extracalvarial soft tissue swelling. No   depressed calvarial fracture. The orbital contents are unremarkable. The   visualized paranasal sinuses are well aerated. The mastoid air cells are   clear.    IMPRESSION:     No acute intracranial hemorrhage, mass effect, or midline shift.           Assessment and Recommendation:     Assessment:	  Patient is a 87y old  Male with pmhX of CAD, MI, Afib on Eliquis, pacemaker, HTN,     DM,and BPH and recent hospitalization at [facility] who was BIBA from [facility] for worsening mental status and mild hallucinations.    Problem/plan:    #1 Acute On chronic Encephalopathy - suspect metabolic encephalopathy in the     setting of suspected Dementia  - R/o CVA 3/13 CT scan unremarkable  - As per neuro - Repeat CT brain 3/15, EEG - negative   - TSH WNL  - dig lvl wnl  - will check b12/folate lvls  - ESR, CRP  - normal.  -  checkANA   - neuro checks Q 8 hours  - will monitor bmp and replace electroyltes as needed.  - fall precautions  - PT consult appreciated    #2 Hx of A. fib  - continue Eliquis, currently rate controlled, has PPM.   - cont Metoprolol.   - cont. digoxin    #3 Mild Pulmonary Edema on x-ray   - resume home Lasix.  - 02 100% on RA no dyspnea      #4 Hx of CVA   - Aspirin / Plavix stopped during prior admission at [facility] will keep off for     risk of bleed.   - continue eliquis and repeat CTH tomorrow    #5 BPH   - continue Flomax QHS.     #6 DM Type II   - holding Metformin,   - on sliding scale  - A1c. 5.8    #7 DVT ppx:   - on Eliquis.     #8 shoulder pain (unable to fully mobilize arm)  - x-ray of shoulder - negative

## 2018-03-14 NOTE — CONSULT NOTE ADULT - ASSESSMENT
This is a 86 y/o male with a PMHx of A. fib on Eliquis w/ PPM, hx of CVA, DM, CAD, CHF and other comorbidities BIBA from [facility] for persistent altered mental status.     Acute On chronic Encephalopathy - suspect metabolic encephalopathy in the setting of suspected Dementia  R/o CVA  Rec Repeat CT brain.  EEG  Correct underlying metabolic causes   Check b12,folate,TSH.  Adjust/ simplify meds if possible.  unable to reach his wife will try to contact.  will F/u after w/u.

## 2018-03-14 NOTE — PHYSICAL THERAPY INITIAL EVALUATION ADULT - MODALITIES TREATMENT COMMENTS
pt left seated in chair post Eval; chair alarm donned; Dr. Hayden / resident team present; callbell in reach; pt instructed not to get up alone; call nursing for assist; naida well; denied pain; RN Bailey made aware pt OOB

## 2018-03-14 NOTE — CONSULT NOTE ADULT - SUBJECTIVE AND OBJECTIVE BOX
Patient is a 87y old  Male who presents with a chief complaint of Altered mental status admitted yesterday . Pt poor historian and history obtained from chart. Unable reach wife at contact number in chart.      HPI: History obtained from admission note.   This is a 86 y/o male with a PMHx of A. fib on Eliquis w/ PPM, hx of CVA, DM, CAD, CHF and other comorbidities BIBA from [facility] for persistent altered mental status. According to admission note patient experienced a fall at home walking down basement steps 1 week ago and was hospitalized at [facility]. During that stay his Asprin / Plavix was stopped, CT scans of the brain negative for acute pathology, CT C-spine and Xrays of hands negative for acute fracture. CT Chest found with bilateral pleural effusions but otherwise patient was discharged to [facility]. Per family they noticed progressive decline in mentation and function and were initially opposed to discharge to [facility]. Upon presentation to [facility] patient did not participate in any activities and was unable to have lucid conversations. + Mild "hallucinations" at [facility] per wife. Prior to fall patient with progressive weakness at home and was minimally ambulatory dependent on wife for assistance with ADLs. In the ED, patient very somnolent but awakens to sternal rub and noxious stimuli. He briefly responds to questions but required much probing. Labs include CBC, CMP and UA which were within normal limits.   CXR revealed mild pulmonary vascular congestion. EKG stable A. fib and on telemetry without arrhthymias Admission requested. MOLST reviewed with wife --> DNR/DNI.    Pt c/o feeling tired and not feeling well. Thinks taking too much meds. Noted to have tremors when asked he says he was seen by Neurologist dx as benign tremors takes medication for it doesn't know what it is and not PD . H/o cardiac problems.      Past Medical History:  Hx of ACS (acute coronary syndrome)    Afib on Eliquis.     BPH (benign prostatic hyperplasia)    CAD (coronary artery disease)    Diabetes    Hypertension    MI (myocardial infarction)    Pacemaker.    NKDA  Social Hx: Lives at home with wife Nancy who is primary caretaker. No ETOH / drug use. Relatively immobile, sits in chair all day at home.   Meds: reviewed.  Family Hx; noncontributory  Surgical Hx: unknown.       Vital Signs Last 24 Hrs  T(C): 36.8 (13 Mar 2018 17:01), Max: 36.8 (13 Mar 2018 17:01)  T(F): 98.2 (13 Mar 2018 17:01), Max: 98.2 (13 Mar 2018 17:01)  HR: 70 (13 Mar 2018 18:17) (70 - 97)  BP: 117/64 (13 Mar 2018 18:17) (101/53 - 122/66)  BP(mean): --  RR: 15 (13 Mar 2018 18:17) (15 - 18)  SpO2: 100% (13 Mar 2018 18:17) (96% - 100%)    PHYSICAL EXAM:  Constitutional: Elderly male appears comfortable but somnolent, opens eyes briefly to noxious stimuli and intermittently responds to questions.   HEENT: PERR, EOMI, Difficulty Hearing.  Neck: Soft and supple.  Respiratory: Breath sounds are clear bilaterally, No wheezing, rales or rhonchi  Cardiovascular: S1 and S2, irregular  no Murmurs, gallops or rubs  Gastrointestinal: Bowel Sounds present, soft, nontender.  Extremities: No peripheral edema  Vascular: 2+ peripheral pulses.  Skin : no rashes      Neurological: A/O x 2 to self  and place , but not to date and events,. Speech slow, appropriate, no aphasia.  Cranial nerves : 2- 12 : No VFF, BREN, mild Rt facial asymmetry, gag intact.   Motor : tone normal, power can not asess moves both sides. Bilat tremors noted but no cog wheel rigidity.  Reflexes : +2 UE +1 BLe plantars down going   Sensory : no loss normal touch.   Coord: Uable to test FN dysmetria  Gait : not tested            LABS: All Labs Reviewed:                        13.2   5.64  )-----------( 145      ( 13 Mar 2018 15:06 )             41.3

## 2018-03-14 NOTE — PHYSICAL THERAPY INITIAL EVALUATION ADULT - GENERAL OBSERVATIONS, REHAB EVAL
pt rec'd in bed supine; R knee bandaged; pt denied pain; RN Bailey present; global muscle atrophy noted

## 2018-03-15 ENCOUNTER — TRANSCRIPTION ENCOUNTER (OUTPATIENT)
Age: 83
End: 2018-03-15

## 2018-03-15 LAB
ANION GAP SERPL CALC-SCNC: 10 MMOL/L — SIGNIFICANT CHANGE UP (ref 5–17)
BUN SERPL-MCNC: 16 MG/DL — SIGNIFICANT CHANGE UP (ref 7–23)
CALCIUM SERPL-MCNC: 8.5 MG/DL — SIGNIFICANT CHANGE UP (ref 8.5–10.1)
CHLORIDE SERPL-SCNC: 105 MMOL/L — SIGNIFICANT CHANGE UP (ref 96–108)
CK SERPL-CCNC: 51 U/L — SIGNIFICANT CHANGE UP (ref 26–308)
CK SERPL-CCNC: 53 U/L — SIGNIFICANT CHANGE UP (ref 26–308)
CO2 SERPL-SCNC: 25 MMOL/L — SIGNIFICANT CHANGE UP (ref 22–31)
CREAT SERPL-MCNC: 0.79 MG/DL — SIGNIFICANT CHANGE UP (ref 0.5–1.3)
GLUCOSE BLDC GLUCOMTR-MCNC: 111 MG/DL — HIGH (ref 70–99)
GLUCOSE BLDC GLUCOMTR-MCNC: 124 MG/DL — HIGH (ref 70–99)
GLUCOSE BLDC GLUCOMTR-MCNC: 126 MG/DL — HIGH (ref 70–99)
GLUCOSE BLDC GLUCOMTR-MCNC: 130 MG/DL — HIGH (ref 70–99)
GLUCOSE BLDC GLUCOMTR-MCNC: 152 MG/DL — HIGH (ref 70–99)
GLUCOSE SERPL-MCNC: 123 MG/DL — HIGH (ref 70–99)
HCT VFR BLD CALC: 38.9 % — LOW (ref 39–50)
HGB BLD-MCNC: 12.9 G/DL — LOW (ref 13–17)
MCHC RBC-ENTMCNC: 28.2 PG — SIGNIFICANT CHANGE UP (ref 27–34)
MCHC RBC-ENTMCNC: 33.2 GM/DL — SIGNIFICANT CHANGE UP (ref 32–36)
MCV RBC AUTO: 84.9 FL — SIGNIFICANT CHANGE UP (ref 80–100)
NRBC # BLD: 0 /100 WBCS — SIGNIFICANT CHANGE UP (ref 0–0)
PLATELET # BLD AUTO: 156 K/UL — SIGNIFICANT CHANGE UP (ref 150–400)
POTASSIUM SERPL-MCNC: 3.7 MMOL/L — SIGNIFICANT CHANGE UP (ref 3.5–5.3)
POTASSIUM SERPL-SCNC: 3.7 MMOL/L — SIGNIFICANT CHANGE UP (ref 3.5–5.3)
RBC # BLD: 4.58 M/UL — SIGNIFICANT CHANGE UP (ref 4.2–5.8)
RBC # FLD: 16.3 % — HIGH (ref 10.3–14.5)
SODIUM SERPL-SCNC: 140 MMOL/L — SIGNIFICANT CHANGE UP (ref 135–145)
TROPONIN I SERPL-MCNC: 0.41 NG/ML — HIGH (ref 0.01–0.04)
TROPONIN I SERPL-MCNC: 0.42 NG/ML — HIGH (ref 0.01–0.04)
TROPONIN I SERPL-MCNC: 0.63 NG/ML — HIGH (ref 0.01–0.04)
VIT D25+D1,25 OH+D1,25 PNL SERPL-MCNC: 37.6 PG/ML — SIGNIFICANT CHANGE UP (ref 19.9–79.3)
WBC # BLD: 9.88 K/UL — SIGNIFICANT CHANGE UP (ref 3.8–10.5)
WBC # FLD AUTO: 9.88 K/UL — SIGNIFICANT CHANGE UP (ref 3.8–10.5)

## 2018-03-15 PROCEDURE — 70450 CT HEAD/BRAIN W/O DYE: CPT | Mod: 26

## 2018-03-15 PROCEDURE — 93010 ELECTROCARDIOGRAM REPORT: CPT

## 2018-03-15 PROCEDURE — 99233 SBSQ HOSP IP/OBS HIGH 50: CPT

## 2018-03-15 RX ORDER — ASPIRIN/CALCIUM CARB/MAGNESIUM 324 MG
81 TABLET ORAL DAILY
Qty: 0 | Refills: 0 | Status: DISCONTINUED | OUTPATIENT
Start: 2018-03-16 | End: 2018-03-16

## 2018-03-15 RX ORDER — TAMSULOSIN HYDROCHLORIDE 0.4 MG/1
1 CAPSULE ORAL
Qty: 0 | Refills: 0 | COMMUNITY

## 2018-03-15 RX ORDER — TAMSULOSIN HYDROCHLORIDE 0.4 MG/1
1 CAPSULE ORAL
Qty: 0 | Refills: 0 | COMMUNITY
Start: 2018-03-15

## 2018-03-15 RX ORDER — DOCUSATE SODIUM 100 MG
1 CAPSULE ORAL
Qty: 0 | Refills: 0 | COMMUNITY
Start: 2018-03-15

## 2018-03-15 RX ORDER — APIXABAN 2.5 MG/1
1 TABLET, FILM COATED ORAL
Qty: 0 | Refills: 0 | COMMUNITY

## 2018-03-15 RX ORDER — APIXABAN 2.5 MG/1
1 TABLET, FILM COATED ORAL
Qty: 0 | Refills: 0 | COMMUNITY
Start: 2018-03-15

## 2018-03-15 RX ORDER — MAGNESIUM HYDROXIDE 400 MG/1
30 TABLET, CHEWABLE ORAL
Qty: 0 | Refills: 0 | COMMUNITY

## 2018-03-15 RX ORDER — MAGNESIUM HYDROXIDE 400 MG/1
30 TABLET, CHEWABLE ORAL
Qty: 0 | Refills: 0 | COMMUNITY
Start: 2018-03-15

## 2018-03-15 RX ORDER — DIGOXIN 250 MCG
1 TABLET ORAL
Qty: 0 | Refills: 0 | COMMUNITY

## 2018-03-15 RX ORDER — PANTOPRAZOLE SODIUM 20 MG/1
40 TABLET, DELAYED RELEASE ORAL
Qty: 0 | Refills: 0 | Status: DISCONTINUED | OUTPATIENT
Start: 2018-03-15 | End: 2018-03-16

## 2018-03-15 RX ORDER — DIGOXIN 250 MCG
1 TABLET ORAL
Qty: 0 | Refills: 0 | COMMUNITY
Start: 2018-03-15

## 2018-03-15 RX ORDER — ASPIRIN/CALCIUM CARB/MAGNESIUM 324 MG
325 TABLET ORAL ONCE
Qty: 0 | Refills: 0 | Status: COMPLETED | OUTPATIENT
Start: 2018-03-15 | End: 2018-03-15

## 2018-03-15 RX ADMIN — Medication 100 MILLIGRAM(S): at 17:49

## 2018-03-15 RX ADMIN — PANTOPRAZOLE SODIUM 40 MILLIGRAM(S): 20 TABLET, DELAYED RELEASE ORAL at 17:49

## 2018-03-15 RX ADMIN — APIXABAN 2.5 MILLIGRAM(S): 2.5 TABLET, FILM COATED ORAL at 05:52

## 2018-03-15 RX ADMIN — Medication 25 MILLIGRAM(S): at 05:52

## 2018-03-15 RX ADMIN — Medication 0.12 MILLIGRAM(S): at 05:52

## 2018-03-15 RX ADMIN — Medication 40 MILLIGRAM(S): at 05:52

## 2018-03-15 RX ADMIN — Medication 1: at 12:27

## 2018-03-15 RX ADMIN — Medication 100 MILLIGRAM(S): at 21:21

## 2018-03-15 RX ADMIN — Medication 325 MILLIGRAM(S): at 17:49

## 2018-03-15 RX ADMIN — APIXABAN 2.5 MILLIGRAM(S): 2.5 TABLET, FILM COATED ORAL at 17:49

## 2018-03-15 RX ADMIN — DONEPEZIL HYDROCHLORIDE 5 MILLIGRAM(S): 10 TABLET, FILM COATED ORAL at 21:21

## 2018-03-15 RX ADMIN — TAMSULOSIN HYDROCHLORIDE 0.4 MILLIGRAM(S): 0.4 CAPSULE ORAL at 21:20

## 2018-03-15 RX ADMIN — Medication 100 MILLIGRAM(S): at 05:52

## 2018-03-15 NOTE — PROVIDER CONTACT NOTE (CHANGE IN STATUS NOTIFICATION) - ASSESSMENT
Patient appears stable with no signs of distress, vital signs stable 02 sat stable 100% on RA, HR 70 and regular on palpation, bp 117/58 RR 20.

## 2018-03-15 NOTE — PROVIDER CONTACT NOTE (CHANGE IN STATUS NOTIFICATION) - SITUATION
Patient complaining of new onset of chest pain vital signs stable no signs of distress but moaning in discomfort.

## 2018-03-15 NOTE — DISCHARGE NOTE ADULT - HOSPITAL COURSE
Patient is a 87y old  Male with pmhX of CAD, MI, Afib on Eliquis, pacemaker, HTN,   DM,and BPH and recent hospitalization at [facility] who was BIBA from [facility] for worsening mental status and mild hallucinations.   Patient is status post fall from 1 week ago 3/8/18 down a flight of steps.    Was hospitalized at [facility] where Asprin / Plavix was stopped, CT scans of   the brain negative for acute pathology, CT C-spine and Xrays of hands negative for   acute fracture. CT Chest found with bilateral pleural effusions but otherwise   patient was discharged to [facility]. Per family they noticed progressive decline in   mentation and function and were initially opposed to discharge to [facility]. Upon   presentation to [facility] patient did not participate in any activities and was   unable to have lucid conversations. + Mild "hallucinations" at [facility] per wife.   Prior to fall patient has been exhibiting signs of gradual decline in ADL's   "strength and appetite" and mentation "feeling more sleepy" for the past 2 months   possibly longer with multiple episodes of falls.      3/15- Patient was seen and examined. no overnight events, no new complaints. Feels better this morning compared to yesterday- all workup has been negative thus far.      ROS:   All 10 systems reviewed and found to be negative with the exception of what has been described above.    Vital Signs Last 24 Hrs  T(C): 36.9 (15 Mar 2018 11:31), Max: 37.6 (15 Mar 2018 05:51)  T(F): 98.5 (15 Mar 2018 11:31), Max: 99.6 (15 Mar 2018 05:51)  HR: 70 (15 Mar 2018 11:31) (70 - 75)  BP: 120/55 (15 Mar 2018 11:31) (112/53 - 120/55)  BP(mean): --  RR: 17 (15 Mar 2018 11:31) (17 - 18)  SpO2: 98% (15 Mar 2018 11:31) (95% - 99%)      PHYSICAL EXAM:  GEN: up in chair, comfortable  HEENT: NC/AT, pupils equal and reactive, EOMI  CV:  +S1, +S2, RRR  RESP:   lungs clear to auscultation bilaterally, no wheeze, rales, rhonchi   GI:  abdomen soft, non-tender, non-distended, normoactive BS  RECTAL:  not examined  :  not examined  MSK:   normal muscle tone  EXT:  no LE edema,+ shoulder pain, unable to fully mobilize arm.   NEURO:  AAOX3, no focal neurological deficits. muscle   strength but equal bilaterally. somonolent  SKIN:  no rashes    PLAN  #1 Acute On chronic Encephalopathy - suspect metabolic encephalopathy in the   setting of suspected Dementia  - R/o CVA 3/13 CT scan unremarkable-   - As per neuro - Repeat CT brain 3/15, EEG - negative   - TSH WNL  - dig lvl wnl  - will check b12/folate lvls- normal  - ESR, CRP  - normal.  -  check KYAW   - fall precautions  - PT consult appreciated with recommendations for [facility]    #2 Hx of A. fib  - continue Eliquis, currently rate controlled, has PPM.   - cont Metoprolol.   - cont. digoxin    #3 Mild Pulmonary Edema on x-ray   - resume home Lasix.    #4 Hx of CVA   - Aspirin / Plavix stopped during prior admission at [facility] will keep off for   risk of bleed.   - continue eliquis     #5 BPH   - continue Flomax QHS.     #6 DM Type II   - holding Metformin- resume on discahrge  - on sliding scale  - A1c. 5.8    #7 DVT ppx:   - on Eliquis.     #8 shoulder pain (unable to fully mobilize arm)  - x-ray of shoulder - negative     Plan discussed with patent- dc to rehab when bed is availbale. Case d/w on IDR. Patient is a 87y old  Male with pmhX of CAD, MI, Afib on Eliquis, pacemaker, HTN,   DM,and BPH and recent hospitalization at [facility] who was BIBA from [facility] for worsening mental status and mild hallucinations.   Patient is status post fall from 1 week ago 3/8/18 down a flight of steps.    Was hospitalized at [facility] where Asprin / Plavix was stopped, CT scans of   the brain negative for acute pathology, CT C-spine and Xrays of hands negative for   acute fracture. CT Chest found with bilateral pleural effusions but otherwise   patient was discharged to [facility]. Per family they noticed progressive decline in   mentation and function and were initially opposed to discharge to [facility]. Upon   presentation to [facility] patient did not participate in any activities and was   unable to have lucid conversations. + Mild "hallucinations" at [facility] per wife.   Prior to fall patient has been exhibiting signs of gradual decline in ADL's   "strength and appetite" and mentation "feeling more sleepy" for the past 2 months   possibly longer with multiple episodes of falls.      3/15- Patient was seen and examined. no overnight events, no new complaints. Feels better this morning compared to yesterday- all workup has been negative thus far.      ROS:   All 10 systems reviewed and found to be negative with the exception of what has been described above.    Vital Signs Last 24 Hrs  T(C): 36.9 (15 Mar 2018 11:31), Max: 37.6 (15 Mar 2018 05:51)  T(F): 98.5 (15 Mar 2018 11:31), Max: 99.6 (15 Mar 2018 05:51)  HR: 70 (15 Mar 2018 11:31) (70 - 75)  BP: 120/55 (15 Mar 2018 11:31) (112/53 - 120/55)  BP(mean): --  RR: 17 (15 Mar 2018 11:31) (17 - 18)  SpO2: 98% (15 Mar 2018 11:31) (95% - 99%)      PHYSICAL EXAM:  GEN: up in chair, comfortable  HEENT: NC/AT, pupils equal and reactive, EOMI  CV:  +S1, +S2, RRR  RESP:   lungs clear to auscultation bilaterally, no wheeze, rales, rhonchi   GI:  abdomen soft, non-tender, non-distended, normoactive BS  RECTAL:  not examined  :  not examined  MSK:   normal muscle tone  EXT:  no LE edema,+ shoulder pain, unable to fully mobilize arm.   NEURO:  AAOX3, no focal neurological deficits. muscle   strength but equal bilaterally. somonolent  SKIN:  no rashes    PLAN  #1 Acute On chronic Encephalopathy - suspect metabolic encephalopathy in the   setting of suspected Dementia  - R/o CVA 3/13 CT scan unremarkable-   - As per neuro - Repeat CT brain 3/15, EEG - negative   - TSH WNL  - dig lvl wnl  - will check b12/folate lvls- normal  - ESR, CRP  - normal.  -  check KYAW   - fall precautions  - PT consult appreciated with recommendations for [facility]    #2 Hx of A. fib  - continue Eliquis, currently rate controlled, has PPM.   - cont Metoprolol.   - cont. digoxin    #3 Mild Pulmonary Edema on x-ray - chronic diastolic heart failure.   - resume home Lasix.    #4 Hx of CVA   - Aspirin / Plavix stopped during prior admission at [facility] will keep off for   risk of bleed.   - continue eliquis     #5 BPH   - continue Flomax QHS.     #6 DM Type II   - holding Metformin- resume on discahrge  - on sliding scale  - A1c. 5.8    #7 DVT ppx:   - on Eliquis.     #8 shoulder pain (unable to fully mobilize arm)  - x-ray of shoulder - negative     Plan discussed with patent- dc to rehab when bed is availbale. Case d/w on IDR. Patient is a 87y old  Male with pmhX of CAD, MI, Afib on Eliquis, pacemaker, HTN,   DM,and BPH and recent hospitalization at [facility] who was BIBA from [facility] for worsening mental status and mild hallucinations.   Patient is status post fall from 1 week ago 3/8/18 down a flight of steps.    Was hospitalized at [facility] where Asprin / Plavix was stopped, CT scans of   the brain negative for acute pathology, CT C-spine and Xrays of hands negative for   acute fracture. CT Chest found with bilateral pleural effusions but otherwise   patient was discharged to [facility]. Per family they noticed progressive decline in   mentation and function and were initially opposed to discharge to [facility]. Upon   presentation to [facility] patient did not participate in any activities and was   unable to have lucid conversations. + Mild "hallucinations" at [facility] per wife.   Prior to fall patient has been exhibiting signs of gradual decline in ADL's   "strength and appetite" and mentation "feeling more sleepy" for the past 2 months   possibly longer with multiple episodes of falls.      3/15- Patient was seen and examined. no overnight events, no new complaints. Feels better this morning compared to yesterday- all workup has been negative thus far.      ROS:   All 10 systems reviewed and found to be negative with the exception of what has been described above.    Vital Signs Last 24 Hrs  T(C): 36.9 (15 Mar 2018 11:31), Max: 37.6 (15 Mar 2018 05:51)  T(F): 98.5 (15 Mar 2018 11:31), Max: 99.6 (15 Mar 2018 05:51)  HR: 70 (15 Mar 2018 11:31) (70 - 75)  BP: 120/55 (15 Mar 2018 11:31) (112/53 - 120/55)  BP(mean): --  RR: 17 (15 Mar 2018 11:31) (17 - 18)  SpO2: 98% (15 Mar 2018 11:31) (95% - 99%)      PHYSICAL EXAM:  GEN: up in chair, comfortable  HEENT: NC/AT, pupils equal and reactive, EOMI  CV:  +S1, +S2, RRR  RESP:   lungs clear to auscultation bilaterally, no wheeze, rales, rhonchi   GI:  abdomen soft, non-tender, non-distended, normoactive BS  RECTAL:  not examined  :  not examined  MSK:   normal muscle tone  EXT:  no LE edema,+ shoulder pain, unable to fully mobilize arm.   NEURO:  AAOX3, no focal neurological deficits. muscle   strength but equal bilaterally. somonolent  SKIN:  no rashes    PLAN  #1 Acute On chronic Encephalopathy - suspect metabolic encephalopathy in the   setting of suspected Dementia  - R/o CVA 3/13 CT scan unremarkable-   - As per neuro - Repeat CT brain 3/15, EEG - negative   - TSH WNL  - dig lvl wnl  - will check b12/folate lvls- normal  - ESR, CRP  - normal.  -  check KYAW   - fall precautions  - PT consult appreciated with recommendations for [facility]    #2 Hx of A. fib  - continue Eliquis, currently rate controlled, has PPM.   - cont Metoprolol.   - cont. digoxin    #3 Mild Pulmonary Edema on x-ray - chronic diastolic heart failure.   - resume home Lasix.    #4 Hx of CVA   - Aspirin / Plavix stopped during prior admission at [facility] will keep off for   risk of bleed.   - continue eliquis     #5 BPH   - continue Flomax QHS.     #6 DM Type II   - holding Metformin- resume on discahrge  - on sliding scale  - A1c. 5.8    #7 DVT ppx:   - on Eliquis.     #8 shoulder pain (unable to fully mobilize arm)  - x-ray of shoulder - negative     * chest pain- reproducible  - Cardiology consult- no aggressive measures  - f/u with cardiology as an outpatient  - continue aspirin and BB    Plan discussed with patent- dc to rehab when bed is availbale. Case d/w on IDR.

## 2018-03-15 NOTE — DISCHARGE NOTE ADULT - CARE PROVIDERS DIRECT ADDRESSES
,DirectAddress_Unknown ,DirectAddress_Unknown,[email or web address]

## 2018-03-15 NOTE — DISCHARGE NOTE ADULT - SECONDARY DIAGNOSIS.
Atrial fibrillation, unspecified type Hypertension, unspecified type Benign prostatic hyperplasia, unspecified whether lower urinary tract symptoms present

## 2018-03-15 NOTE — CDI QUERY NOTE - NSCDICHFTXTBX_GEN_ALL_CORE_HH
Documentation on chart of HX of CHF. Lasix 40 mg po q day. CXR showed min. vascular congestion and right pleural effusion. Please clarify a Type and Acuity of CHF.

## 2018-03-15 NOTE — DISCHARGE NOTE ADULT - CARE PROVIDER_API CALL
MD At the rehab fcaility,   Phone: (   )    -  Fax: (   )    - MD At the rehab fcaility,   Phone: (   )    -  Fax: (   )    -    Lex Jimenez (MD), Cardiovascular Disease; Internal Medicine  [address] [place] [address]  Phone: [phone]  Fax: [phone]    Mely Wilson), Neurology  General  [address] [place] [address]  Phone: [phone]  Fax: [phone]

## 2018-03-15 NOTE — PROGRESS NOTE ADULT - ASSESSMENT
Assessment and Recommendation:   · Assessment		  This is a 88 y/o male with a PMHx of A. fib on Eliquis w/ PPM, hx of CVA, DM, CAD, CHF and other comorbidities BIBA from [facility] for persistent altered mental status.     Acute On chronic Encephalopathy - suspect metabolic encephalopathy in the setting of suspected Dementia  R/o CVA  Rec Repeat CT brain.  EEG reported as normal.  Correct underlying metabolic causes   Check b12,folate,TSH.  Adjust/ simplify meds if possible.  unable to reach his wife will try to contact.  PT/OT for OOB/ ambulate.  If Stable medically D/c planning to Short term rehab.  consider adding namenda 5 mg twice a day if no contraindications.  F/u in office after Dc from rehab.

## 2018-03-15 NOTE — DISCHARGE NOTE ADULT - PROVIDER TOKENS
FREE:[LAST[facility]PHONE:[(   )    -],FAX:[(   )    -]] FREE:[LAST:[MD At the rehab [facility]],PHONE:[(   )    -],FAX:[(   )    -]],TOKEN:'[id number]',TOKEN:'[id number]'

## 2018-03-15 NOTE — PROGRESS NOTE ADULT - SUBJECTIVE AND OBJECTIVE BOX
HPI: History obtained from admission note.   This is a 86 y/o male with a PMHx of A. fib on Eliquis w/ PPM, hx of CVA, DM, CAD, CHF and other comorbidities BIBA from [facility] for persistent altered mental status. According to admission note patient experienced a fall at home walking down basement steps 1 week ago and was hospitalized at [facility]. During that stay his Asprin / Plavix was stopped, CT scans of the brain negative for acute pathology, CT C-spine and Xrays of hands negative for acute fracture. CT Chest found with bilateral pleural effusions but otherwise patient was discharged to [facility]. Per family they noticed progressive decline in mentation and function and were initially opposed to discharge to [facility]. Upon presentation to [facility] patient did not participate in any activities and was unable to have lucid conversations. + Mild "hallucinations" at [facility] per wife. Prior to fall patient with progressive weakness at home and was minimally ambulatory dependent on wife for assistance with ADLs. In the ED, patient very somnolent but awakens to sternal rub and noxious stimuli. He briefly responds to questions but required much probing. Labs include CBC, CMP and UA which were within normal limits.   CXR revealed mild pulmonary vascular congestion. EKG stable A. fib and on telemetry without arrhthymias Admission requested. MOLST reviewed with wife --> DNR/DNI.  Pt c/o feeling tired and not feeling well. Thinks taking too much meds. Noted to have tremors when asked he says he was seen by Neurologist dx as benign tremors takes medication for it doesn't know what it is and not PD . H/o cardiac problems.    3/15/18 : Pt more alert, responsive, follows commands, eating breakfast in bed. states he is feeling better wants to get OOB ambulate with PT. No focal c/o. EEG completed no acute pathology. repeat CT and labs pending.         Past Medical History:  Hx of ACS (acute coronary syndrome)    Afib on Eliquis.     BPH (benign prostatic hyperplasia)    CAD (coronary artery disease)    Diabetes    Hypertension    MI (myocardial infarction)    Pacemaker.  MEDICATIONS  (STANDING):  apixaban 2.5 milliGRAM(s) Oral every 12 hours  dextrose 5%. 1000 milliLiter(s) (50 mL/Hr) IV Continuous <Continuous>  dextrose 50% Injectable 12.5 Gram(s) IV Push once  dextrose 50% Injectable 25 Gram(s) IV Push once  dextrose 50% Injectable 25 Gram(s) IV Push once  digoxin     Tablet 0.125 milliGRAM(s) Oral daily  docusate sodium 100 milliGRAM(s) Oral three times a day  donepezil 5 milliGRAM(s) Oral at bedtime  furosemide    Tablet 40 milliGRAM(s) Oral daily  insulin lispro (HumaLOG) corrective regimen sliding scale   SubCutaneous three times a day before meals  metoprolol succinate ER 25 milliGRAM(s) Oral daily  tamsulosin 0.4 milliGRAM(s) Oral at bedtime      Vital Signs Last 24 Hrs  T(C): 37.6 (15 Mar 2018 05:51), Max: 37.6 (15 Mar 2018 05:51)  T(F): 99.6 (15 Mar 2018 05:51), Max: 99.6 (15 Mar 2018 05:51)  HR: 75 (15 Mar 2018 05:51) (70 - 75)  BP: 112/53 (15 Mar 2018 05:51) (110/48 - 118/50)  BP(mean): --  RR: 17 (15 Mar 2018 05:51) (17 - 18)  SpO2: 95% (15 Mar 2018 05:51) (95% - 99%)    Constitutional: Elderly male appears comfortable more alert than yesterday follows commnds well.   HEENT: PERR, EOMI, Difficulty Hearing.  Neck: Soft and supple.  Respiratory: Breath sounds are clear bilaterally, No wheezing, rales or rhonchi  Cardiovascular: S1 and S2, irregular  no Murmurs, gallops or rubs  Gastrointestinal: Bowel Sounds present, soft, nontender.  Extremities: No peripheral edema  Vascular: 2+ peripheral pulses.  Skin : no rashes      Neurological: A/O x 2 to self  and place  more alert and appropriate today than yesterday,. Speech  normal, appropriate, no aphasia.  Cranial nerves : 2- 12 : No VFF, BREN, mild Rt facial asymmetry, gag intact.   Motor : tone normal, power can not asess moves both sides. Bilat tremors noted but no cog wheel rigidity.  Reflexes : +2 UE +1 BLe plantars down going   Sensory : no loss normal touch.   Coord: Uable to test FN dysmetria  Gait : not tested                        12.9   9.88  )-----------( 156      ( 15 Mar 2018 07:09 )             38.9     03-15    140  |  105  |  16  ----------------------------<  123<H>  3.7   |  25  |  0.79    Ca    8.5      15 Mar 2018 07:09  Mg     2.1     03-13    TPro  6.5  /  Alb  3.0<L>  /  TBili  1.7<H>  /  DBili  x   /  AST  22  /  ALT  13  /  AlkPhos  68  03-13    03-14 [id number] 5.8        Radiology report:  - CT Head  < from: CT Head No Cont (03.13.18 @ 16:29) >  IMPRESSION:     No acute intracranial hemorrhage, mass effect, or midline shift.   - EEG 3/14/18  < from: EEG (03.14.18 @ 11:00) >  Impression : This is a normal awake, drowsy and brief sleep recording.   Clinical correlation recommended.    >

## 2018-03-15 NOTE — DISCHARGE NOTE ADULT - PATIENT PORTAL LINK FT
You can access the [place] [facility] Portal, offered by [facility], by registering with the following website: [email or web address]

## 2018-03-15 NOTE — DISCHARGE NOTE ADULT - PLAN OF CARE
prevent worsening c/w aricept  supportive care continue apixaban  f/u with cardiologist continue antihypertensive regimen cont medication regimen

## 2018-03-15 NOTE — DISCHARGE NOTE ADULT - MEDICATION SUMMARY - MEDICATIONS TO TAKE
I will START or STAY ON the medications listed below when I get home from the hospital:    acetaminophen 650 mg oral tablet  -- 1 tab(s) by mouth every 6 hours, As Needed for pain  -- Indication: For Pain    magnesium hydroxide 8% oral suspension  -- 30 milliliter(s) by mouth once a day, As needed, Constipation  -- Indication: For Antacids    tamsulosin 0.4 mg oral capsule  -- 1 cap(s) by mouth once a day (at bedtime)  -- Indication: For BPH    digoxin 125 mcg (0.125 mg) oral tablet  -- 1 tab(s) by mouth once a day  -- Indication: For Antiarrhythmic    apixaban 2.5 mg oral tablet  -- 1 tab(s) by mouth every 12 hours  -- Indication: For Anticoagulant    gabapentin 300 mg oral capsule  -- 1 cap(s) by mouth 2 times a day  -- Indication: For Pain    NovoLOG 100 units/mL subcutaneous solution  -- Sliding scale insulin subcutaneous 3 times a day (before meals)  -- Indication: For Diabetes    metFORMIN 500 mg oral tablet  -- 1 tab(s) by mouth once a day  -- Indication: For Diabetes    metoprolol succinate 25 mg oral tablet, extended release  -- 1 tab(s) by mouth once a day (at bedtime)  -- Indication: For HTN    donepezil 5 mg oral tablet  -- 1 tab(s) by mouth once a day (at bedtime)  -- Indication: For Dementia    furosemide 40 mg oral tablet  -- 1 tab(s) by mouth once a day  -- Indication: For Diuretic    docusate sodium 100 mg oral capsule  -- 1 cap(s) by mouth 3 times a day  -- Indication: For constipation I will START or STAY ON the medications listed below when I get home from the hospital:    acetaminophen 650 mg oral tablet  -- 1 tab(s) by mouth every 6 hours, As Needed for pain  -- Indication: For Pain    aspirin 81 mg oral delayed release tablet  -- 1 tab(s) by mouth once a day  -- Indication: For CAD    magnesium hydroxide 8% oral suspension  -- 30 milliliter(s) by mouth once a day, As needed, Constipation  -- Indication: For Antacids    tamsulosin 0.4 mg oral capsule  -- 1 cap(s) by mouth once a day (at bedtime)  -- Indication: For BPH    digoxin 125 mcg (0.125 mg) oral tablet  -- 1 tab(s) by mouth once a day  -- Indication: For Antiarrhythmic    apixaban 2.5 mg oral tablet  -- 1 tab(s) by mouth every 12 hours  -- Indication: For Anticoagulant    gabapentin 300 mg oral capsule  -- 1 cap(s) by mouth 2 times a day  -- Indication: For Pain    NovoLOG 100 units/mL subcutaneous solution  -- Sliding scale insulin subcutaneous 3 times a day (before meals)  -- Indication: For Diabetes    metFORMIN 500 mg oral tablet  -- 1 tab(s) by mouth once a day  -- Indication: For Diabetes    metoprolol succinate 25 mg oral tablet, extended release  -- 1 tab(s) by mouth once a day (at bedtime)  -- Indication: For HTN    donepezil 5 mg oral tablet  -- 1 tab(s) by mouth once a day (at bedtime)  -- Indication: For Dementia    furosemide 40 mg oral tablet  -- 1 tab(s) by mouth once a day  -- Indication: For Diuretic    docusate sodium 100 mg oral capsule  -- 1 cap(s) by mouth 3 times a day  -- Indication: For constipation    pantoprazole 40 mg oral delayed release tablet  -- 1 tab(s) by mouth once a day (before a meal)  -- Indication: For PPI

## 2018-03-15 NOTE — DISCHARGE NOTE ADULT - CARE PLAN
Principal Discharge DX:	Altered mental status, unspecified altered mental status type  Goal:	prevent worsening  Assessment and plan of treatment:	c/w aricept  supportive care  Secondary Diagnosis:	Atrial fibrillation, unspecified type  Assessment and plan of treatment:	continue apixaban  f/u with cardiologist  Secondary Diagnosis:	Hypertension, unspecified type  Assessment and plan of treatment:	continue antihypertensive regimen  Secondary Diagnosis:	Benign prostatic hyperplasia, unspecified whether lower urinary tract symptoms present  Assessment and plan of treatment:	cont medication regimen

## 2018-03-16 VITALS
SYSTOLIC BLOOD PRESSURE: 114 MMHG | HEART RATE: 70 BPM | OXYGEN SATURATION: 99 % | RESPIRATION RATE: 16 BRPM | TEMPERATURE: 98 F | DIASTOLIC BLOOD PRESSURE: 47 MMHG

## 2018-03-16 DIAGNOSIS — I25.10 ATHEROSCLEROTIC HEART DISEASE OF NATIVE CORONARY ARTERY WITHOUT ANGINA PECTORIS: ICD-10-CM

## 2018-03-16 DIAGNOSIS — Z95.0 PRESENCE OF CARDIAC PACEMAKER: ICD-10-CM

## 2018-03-16 DIAGNOSIS — I10 ESSENTIAL (PRIMARY) HYPERTENSION: ICD-10-CM

## 2018-03-16 DIAGNOSIS — I48.91 UNSPECIFIED ATRIAL FIBRILLATION: ICD-10-CM

## 2018-03-16 DIAGNOSIS — R41.82 ALTERED MENTAL STATUS, UNSPECIFIED: ICD-10-CM

## 2018-03-16 DIAGNOSIS — R74.8 ABNORMAL LEVELS OF OTHER SERUM ENZYMES: ICD-10-CM

## 2018-03-16 LAB
ANION GAP SERPL CALC-SCNC: 9 MMOL/L — SIGNIFICANT CHANGE UP (ref 5–17)
BUN SERPL-MCNC: 15 MG/DL — SIGNIFICANT CHANGE UP (ref 7–23)
CALCIUM SERPL-MCNC: 8.2 MG/DL — LOW (ref 8.5–10.1)
CHLORIDE SERPL-SCNC: 103 MMOL/L — SIGNIFICANT CHANGE UP (ref 96–108)
CO2 SERPL-SCNC: 24 MMOL/L — SIGNIFICANT CHANGE UP (ref 22–31)
CREAT SERPL-MCNC: 0.76 MG/DL — SIGNIFICANT CHANGE UP (ref 0.5–1.3)
GLUCOSE BLDC GLUCOMTR-MCNC: 111 MG/DL — HIGH (ref 70–99)
GLUCOSE BLDC GLUCOMTR-MCNC: 151 MG/DL — HIGH (ref 70–99)
GLUCOSE BLDC GLUCOMTR-MCNC: 185 MG/DL — HIGH (ref 70–99)
GLUCOSE SERPL-MCNC: 133 MG/DL — HIGH (ref 70–99)
HCT VFR BLD CALC: 38.8 % — LOW (ref 39–50)
HGB BLD-MCNC: 13 G/DL — SIGNIFICANT CHANGE UP (ref 13–17)
MCHC RBC-ENTMCNC: 28.3 PG — SIGNIFICANT CHANGE UP (ref 27–34)
MCHC RBC-ENTMCNC: 33.5 GM/DL — SIGNIFICANT CHANGE UP (ref 32–36)
MCV RBC AUTO: 84.5 FL — SIGNIFICANT CHANGE UP (ref 80–100)
NRBC # BLD: 0 /100 WBCS — SIGNIFICANT CHANGE UP (ref 0–0)
PLATELET # BLD AUTO: 162 K/UL — SIGNIFICANT CHANGE UP (ref 150–400)
POTASSIUM SERPL-MCNC: 3.5 MMOL/L — SIGNIFICANT CHANGE UP (ref 3.5–5.3)
POTASSIUM SERPL-SCNC: 3.5 MMOL/L — SIGNIFICANT CHANGE UP (ref 3.5–5.3)
RBC # BLD: 4.59 M/UL — SIGNIFICANT CHANGE UP (ref 4.2–5.8)
RBC # FLD: 16.5 % — HIGH (ref 10.3–14.5)
SODIUM SERPL-SCNC: 136 MMOL/L — SIGNIFICANT CHANGE UP (ref 135–145)
TROPONIN I SERPL-MCNC: 0.45 NG/ML — HIGH (ref 0.01–0.04)
WBC # BLD: 10.42 K/UL — SIGNIFICANT CHANGE UP (ref 3.8–10.5)
WBC # FLD AUTO: 10.42 K/UL — SIGNIFICANT CHANGE UP (ref 3.8–10.5)

## 2018-03-16 PROCEDURE — 93010 ELECTROCARDIOGRAM REPORT: CPT

## 2018-03-16 PROCEDURE — 99223 1ST HOSP IP/OBS HIGH 75: CPT

## 2018-03-16 PROCEDURE — 99233 SBSQ HOSP IP/OBS HIGH 50: CPT

## 2018-03-16 RX ORDER — PANTOPRAZOLE SODIUM 20 MG/1
1 TABLET, DELAYED RELEASE ORAL
Qty: 0 | Refills: 0 | COMMUNITY
Start: 2018-03-16

## 2018-03-16 RX ORDER — ASPIRIN/CALCIUM CARB/MAGNESIUM 324 MG
1 TABLET ORAL
Qty: 0 | Refills: 0 | COMMUNITY
Start: 2018-03-16

## 2018-03-16 RX ADMIN — APIXABAN 2.5 MILLIGRAM(S): 2.5 TABLET, FILM COATED ORAL at 05:38

## 2018-03-16 RX ADMIN — Medication 1: at 11:49

## 2018-03-16 RX ADMIN — Medication 100 MILLIGRAM(S): at 13:40

## 2018-03-16 RX ADMIN — Medication 25 MILLIGRAM(S): at 05:38

## 2018-03-16 RX ADMIN — Medication 81 MILLIGRAM(S): at 11:49

## 2018-03-16 RX ADMIN — PANTOPRAZOLE SODIUM 40 MILLIGRAM(S): 20 TABLET, DELAYED RELEASE ORAL at 06:40

## 2018-03-16 RX ADMIN — Medication 0.12 MILLIGRAM(S): at 05:38

## 2018-03-16 RX ADMIN — Medication 100 MILLIGRAM(S): at 05:38

## 2018-03-16 NOTE — CONSULT NOTE ADULT - PROBLEM SELECTOR RECOMMENDATION 6
Unclear etiology. Doubt acute MI. Continue BB and antiplatelets. Given functional decline and DNR and DNI pt is nto a candidate more aggressive intervention.

## 2018-03-16 NOTE — PROGRESS NOTE ADULT - SUBJECTIVE AND OBJECTIVE BOX
HPI: History obtained from admission note.   This is a 86 y/o male with a PMHx of A. fib on Eliquis w/ PPM, hx of CVA, DM, CAD, CHF and other comorbidities BIBA from [facility] for persistent altered mental status. According to admission note patient experienced a fall at home walking down basement steps 1 week ago and was hospitalized at [facility]. During that stay his Asprin / Plavix was stopped, CT scans of the brain negative for acute pathology, CT C-spine and Xrays of hands negative for acute fracture. CT Chest found with bilateral pleural effusions but otherwise patient was discharged to [facility]. Per family they noticed progressive decline in mentation and function and were initially opposed to discharge to [facility]. Upon presentation to [facility] patient did not participate in any activities and was unable to have lucid conversations. + Mild "hallucinations" at [facility] per wife. Prior to fall patient with progressive weakness at home and was minimally ambulatory dependent on wife for assistance with ADLs. In the ED, patient very somnolent but awakens to sternal rub and noxious stimuli. He briefly responds to questions but required much probing. Labs include CBC, CMP and UA which were within normal limits.   CXR revealed mild pulmonary vascular congestion. EKG stable A. fib and on telemetry without arrhthymias Admission requested. MOLST reviewed with wife --> DNR/DNI.  Pt c/o feeling tired and not feeling well. Thinks taking too much meds. Noted to have tremors when asked he says he was seen by Neurologist dx as benign tremors takes medication for it doesn't know what it is and not PD . H/o cardiac problems.    3/15/18 : Pt more alert, responsive, follows commands, eating breakfast in bed. states he is feeling better wants to get OOB ambulate with PT. No focal c/o. EEG completed no acute pathology. repeat CT and labs pending.       3/16/18 : Pt in bed, awake, alert , Pt trying to get him OOB  to ambulate, Pt offers no c/o except feeling tired though pt looks better. Denies of any focal C/o. general fatigue.     MEDICATIONS  (STANDING):  apixaban 2.5 milliGRAM(s) Oral every 12 hours  aspirin enteric coated 81 milliGRAM(s) Oral daily  dextrose 5%. 1000 milliLiter(s) (50 mL/Hr) IV Continuous <Continuous>  dextrose 50% Injectable 12.5 Gram(s) IV Push once  dextrose 50% Injectable 25 Gram(s) IV Push once  dextrose 50% Injectable 25 Gram(s) IV Push once  digoxin     Tablet 0.125 milliGRAM(s) Oral daily  docusate sodium 100 milliGRAM(s) Oral three times a day  donepezil 5 milliGRAM(s) Oral at bedtime  furosemide    Tablet 40 milliGRAM(s) Oral daily  insulin lispro (HumaLOG) corrective regimen sliding scale   SubCutaneous three times a day before meals  metoprolol succinate ER 25 milliGRAM(s) Oral daily  pantoprazole    Tablet 40 milliGRAM(s) Oral before breakfast  tamsulosin 0.4 milliGRAM(s) Oral at bedtime      Vital Signs Last 24 Hrs  T(C): 36.7 (16 Mar 2018 11:39), Max: 36.8 (15 Mar 2018 21:17)  T(F): 98 (16 Mar 2018 11:39), Max: 98.2 (15 Mar 2018 21:17)  HR: 70 (16 Mar 2018 11:39) (70 - 72)  BP: 114/47 (16 Mar 2018 11:39) (109/60 - 117/58)  BP(mean): --  RR: 16 (16 Mar 2018 11:39) (16 - 18)  SpO2: 99% (16 Mar 2018 11:39) (96% - 100%)    Constitutional: Elderly male appears comfortable more alert than yesterday follows commnds well.   HEENT: PERR, EOMI, Difficulty Hearing.  Neck: Soft and supple.  Respiratory: Breath sounds are clear bilaterally, No wheezing, rales or rhonchi  Cardiovascular: S1 and S2, irregular  no Murmurs, gallops or rubs  Gastrointestinal: Bowel Sounds present, soft, nontender.  Extremities: No peripheral edema  Vascular: 2+ peripheral pulses.  Skin : no rashes      Neurological: A/O x 2 to self  and place  more alert and appropriate today than yesterday,. Speech  normal, appropriate, no aphasia.  Cranial nerves : 2- 12 : No VFF, BREN, mild Rt facial asymmetry, gag intact.   Motor : tone normal, power can not asess moves both sides. Bilat tremors noted but no cog wheel rigidity.  Reflexes : +2 UE +1 BLe plantars down going   Sensory : no loss normal touch.   Coord: Uable to test FN dysmetria  Gait : not tested                                 13.0   10.42 )-----------( 162      ( 16 Mar 2018 06:46 )             38.8     03-16    136  |  103  |  15  ----------------------------<  133<H>  3.5   |  24  |  0.76    Ca    8.2<L>      16 Mar 2018 06:46      03-14 [id number] 5.8        Radiology report:  - CT Head  < from: CT Head No Cont (03.13.18 @ 16:29) >  IMPRESSION:     No acute intracranial hemorrhage, mass effect, or midline shift.   - EEG 3/14/18  < from: EEG (03.14.18 @ 11:00) >  Impression : This is a normal awake, drowsy and brief sleep recording.   Clinical correlation recommended.

## 2018-03-16 NOTE — CONSULT NOTE ADULT - PROBLEM SELECTOR RECOMMENDATION 4
Pt is asymptomatic although compromised functional status. Continue current meds. No clinical evidence of ischemia.

## 2018-03-16 NOTE — CONSULT NOTE ADULT - SUBJECTIVE AND OBJECTIVE BOX
PCP:    REQUESTING PHYSICIAN:    REASON FOR CONSULT:    CHIEF COMPLAINT:    HPI:  This is a 86 y/o male with a PMHx of A. fib on Eliquis w/ PPM, hx of CVA, DM, CAD, CHF and other comorbidities BIBA from [facility] for persistent altered mental status. According to wife and ED staff patient experienced a fall at home walking down basement steps 1 week ago and was hospitalized at [facility]. During that stay his Asprin / Plavix was stopped, CT scans of the brain negative for acute pathology, CT C-spine and Xrays of hands negative for acute fracture. CT Chest found with bilateral pleural effusions but otherwise patient was discharged to [facility]. Per family they noticed progressive decline in mentation and function and were initially opposed to discharge to [facility]. Upon presentation to [facility] patient did not participate in any activities and was unable to have lucid conversations. + Mild "hallucinations" at [facility] per wife. Prior to fall patient with progressive weakness at home and was minimally ambulatory dependent on wife for assistance with ADLs.     In the ED, patient very somnolent but awakens to sternal rub and noxious stimuli. He briefly responds to questions but required much probing. Labs include CBC, CMP and UA which were within normal limits.   CXR revealed mild pulmonary vascular congestion. EKG stable A. fib and on telemetry without arrhthymias Admission requested. MOLST reviewed with wife --> DNR/DNI.  Cardiology consult requested to evaluate elevated troponins. Pt denies chest pain or shortness of breath. He cannot relate a comprehensive history and appears in no distress.     ROS: unable to obtain 2/2 poor mentation.     Past Medical History:  Hx of ACS (acute coronary syndrome)    Afib on Eliquis.     BPH (benign prostatic hyperplasia)    CAD (coronary artery disease)    Diabetes    Hypertension    MI (myocardial infarction)    Pacemaker.    NKDA  Social Hx: Lives at home with wife Nancy who is primary caretaker. No ETOH / drug use. Relatively immobile, sits in chair all day at home.   Meds: reviewed.  Family Hx; noncontributory  Surgical Hx: unknown.       Code Status: DNR/ DNI, case discussed w/ wife Nancy. MOLST in chart. Additional 18mins spent. (13 Mar 2018 18:33)      PAST MEDICAL & SURGICAL HISTORY:  Pacemaker  Afib  Diabetes  MI (myocardial infarction)  BPH (benign prostatic hyperplasia)  Hypertension  ACS (acute coronary syndrome)  CAD (coronary artery disease)      Allergies    No Known Allergies    Intolerances        SOCIAL HISTORY:    FAMILY HISTORY:      MEDICATIONS:  MEDICATIONS  (STANDING):  apixaban 2.5 milliGRAM(s) Oral every 12 hours  aspirin enteric coated 81 milliGRAM(s) Oral daily  dextrose 5%. 1000 milliLiter(s) (50 mL/Hr) IV Continuous <Continuous>  dextrose 50% Injectable 12.5 Gram(s) IV Push once  dextrose 50% Injectable 25 Gram(s) IV Push once  dextrose 50% Injectable 25 Gram(s) IV Push once  digoxin     Tablet 0.125 milliGRAM(s) Oral daily  docusate sodium 100 milliGRAM(s) Oral three times a day  donepezil 5 milliGRAM(s) Oral at bedtime  furosemide    Tablet 40 milliGRAM(s) Oral daily  insulin lispro (HumaLOG) corrective regimen sliding scale   SubCutaneous three times a day before meals  metoprolol succinate ER 25 milliGRAM(s) Oral daily  pantoprazole    Tablet 40 milliGRAM(s) Oral before breakfast  tamsulosin 0.4 milliGRAM(s) Oral at bedtime    MEDICATIONS  (PRN):  acetaminophen   Tablet. 650 milliGRAM(s) Oral every 6 hours PRN Mild Pain (1 - 3)  dextrose Gel 1 Dose(s) Oral once PRN Blood Glucose LESS THAN 70 milliGRAM(s)/deciliter  glucagon  Injectable 1 milliGRAM(s) IntraMuscular once PRN Glucose LESS THAN 70 milligrams/deciliter  magnesium hydroxide Suspension 30 milliLiter(s) Oral daily PRN Constipation  ondansetron Injectable 4 milliGRAM(s) IV Push every 6 hours PRN Nausea      T(C): 36.2 (16 Mar 2018 05:12), Max: 36.9 (15 Mar 2018 11:31)  T(F): 97.2 (16 Mar 2018 05:12), Max: 98.5 (15 Mar 2018 11:31)  HR: 70 (16 Mar 2018 05:12) (70 - 72)  BP: 109/60 (16 Mar 2018 05:12) (109/60 - 120/55)  BP(mean): --  RR: 16 (16 Mar 2018 05:12) (16 - 18)  SpO2: 96% (16 Mar 2018 05:12) (96% - 100%)    I&O's Summary      PHYSICAL EXAM:    Constitutional: NAD, lethargic and minimally verbal  HEENT: PERR, EOMI,  No oral cyananosis.  Neck:  supple,  No JVD  Respiratory: Breath sounds are clear bilaterally, No wheezing, rales or rhonchi  Cardiovascular: S1 and S2, regular rate and rhythm, 1/6 STEFFANIE  Gastrointestinal: Bowel Sounds present, soft, nontender.   Extremities: No peripheral edema. No clubbing or cyanosis.  Vascular: 2+ peripheral pulses  Neurological: A/O x 3, no focal deficits  Musculoskeletal: no calf tenderness.  Skin: No rashes.      LABS: All Labs Reviewed:                        13.0   10.42 )-----------( 162      ( 16 Mar 2018 06:46 )             38.8                         12.9   9.88  )-----------( 156      ( 15 Mar 2018 07:09 )             38.9                         13.2   5.64  )-----------( 145      ( 13 Mar 2018 15:06 )             41.3     16 Mar 2018 06:46    136    |  103    |  15     ----------------------------<  133    3.5     |  24     |  0.76   15 Mar 2018 07:09    140    |  105    |  16     ----------------------------<  123    3.7     |  25     |  0.79   14 Mar 2018 06:27    139    |  105    |  15     ----------------------------<  111    3.3     |  26     |  0.67     Ca    8.2        16 Mar 2018 06:46  Ca    8.5        15 Mar 2018 07:09  Ca    8.2        14 Mar 2018 06:27  Mg     2.1       13 Mar 2018 15:06    TPro  6.5    /  Alb  3.0    /  TBili  1.7    /  DBili  x      /  AST  22     /  ALT  13     /  AlkPhos  68     13 Mar 2018 15:06      CARDIAC MARKERS ( 16 Mar 2018 06:46 )  0.451 ng/mL / x     / x     / x     / x      CARDIAC MARKERS ( 15 Mar 2018 22:25 )  0.633 ng/mL / x     / x     / x     / x      CARDIAC MARKERS ( 15 Mar 2018 17:45 )  0.406 ng/mL / x     / 51 U/L / x     / x      CARDIAC MARKERS ( 15 Mar 2018 14:47 )  0.423 ng/mL / x     / 53 U/L / x     / x          Blood Culture: Organism --  Gram Stain Blood -- Gram Stain --  Specimen Source .Blood Blood-Peripheral  Culture-Blood --    Organism --  Gram Stain Blood -- Gram Stain --  Specimen Source .Urine Clean Catch (Midstream)  Culture-Blood --        03-14 @ 06:27  TSH: 1.310      RADIOLOGY/EKG:< from: 12 Lead ECG (03.13.18 @ 14:56) >  Diagnosis Line Ventricular-paced rhythm  Abnormal ECG  No previous ECGs available  Confirmed by Jeet Troy MD ([id number]) on 3/14/2018 8:19:18 AM    < end of copied text >

## 2018-03-16 NOTE — PROGRESS NOTE ADULT - SUBJECTIVE AND OBJECTIVE BOX
Patient is a 87y old  Male with pmhX of CAD, MI, Afib on Eliquis, pacemaker, HTN,   DM,and BPH and recent hospitalization at [facility] who was BIBA from [facility] for worsening mental status and mild hallucinations.   Patient is status post fall from 1 week ago 3/8/18 down a flight of steps.    Was hospitalized at [facility] where Asprin / Plavix was stopped, CT scans of   the brain negative for acute pathology, CT C-spine and Xrays of hands negative for   acute fracture. CT Chest found with bilateral pleural effusions but otherwise   patient was discharged to [facility]. Per family they noticed progressive decline in   mentation and function and were initially opposed to discharge to [facility]. Upon   presentation to [facility] patient did not participate in any activities and was   unable to have lucid conversations. + Mild "hallucinations" at [facility] per wife.   Prior to fall patient has been exhibiting signs of gradual decline in ADL's   "strength and appetite" and mentation "feeling more sleepy" for the past 2 months   possibly longer with multiple episodes of falls.      3/16- discharge was cancelled yesterday because of chest pain- +troponin- Cardiology consulted and patient was cleared for discahrge today- doubt it's MI and as per report pain was reproducible.        ROS:   All 10 systems reviewed and found to be negative with the exception of what has been described above.    Vital Signs Last 24 Hrs  T(C): 36.7 (16 Mar 2018 11:39), Max: 36.8 (15 Mar 2018 21:17)  T(F): 98 (16 Mar 2018 11:39), Max: 98.2 (15 Mar 2018 21:17)  HR: 70 (16 Mar 2018 11:39) (70 - 72)  BP: 114/47 (16 Mar 2018 11:39) (109/60 - 117/58)  BP(mean): --  RR: 16 (16 Mar 2018 11:39) (16 - 18)  SpO2: 99% (16 Mar 2018 11:39) (96% - 100%)      PHYSICAL EXAM:  GEN: up in chair, comfortable  HEENT: NC/AT, pupils equal and reactive, EOMI  CV:  +S1, +S2, RRR  RESP:   lungs clear to auscultation bilaterally, no wheeze, rales, rhonchi   GI:  abdomen soft, non-tender, non-distended, normoactive BS  RECTAL:  not examined  :  not examined  MSK:   normal muscle tone  EXT:  no LE edema,+ shoulder pain, unable to fully mobilize arm.   NEURO:  AAOX3, no focal neurological deficits. muscle   strength but equal bilaterally. somonolent  SKIN:  no rashes    PLAN  #1 Acute On chronic Encephalopathy - suspect metabolic encephalopathy in the   setting of suspected Dementia  - R/o CVA 3/13 CT scan unremarkable-   - As per neuro - Repeat CT brain 3/15, EEG - negative   - TSH WNL  - dig lvl wnl  - will check b12/folate lvls- normal  - ESR, CRP  - normal.  - fall precautions  - PT consult appreciated with recommendations for [facility]    #2 Hx of A. fib  - continue Eliquis, currently rate controlled, has PPM.   - cont Metoprolol.   - cont. digoxin    #3 Mild Pulmonary Edema on x-ray - chronic diastolic heart failure.   - resume home Lasix.    #4 Hx of CVA   - Aspirin / Plavix stopped during prior admission at [facility] will keep off for   risk of bleed.   - continue eliquis     #5 BPH   - continue Flomax QHS.     #6 DM Type II   - holding Metformin- resume on discharge  - on sliding scale  - A1c. 5.8      #7 shoulder pain (unable to fully mobilize arm)  - x-ray of shoulder - negative    #8- Chest pain   - EKG noted  +troponin- trending down  _ cardiology consult with Dr Aceves- as per service - highly unlikely that its MI- pain was reproducible  - not a candidate for aggressive intervention  - will continue BB and aspirin  - no clinical evidence of ischemia  - f/u as an outpatient.     Case d/w team on IDR. Plan for discharge to rehab today. Patient is a 87y old  Male with pmhX of CAD, MI, Afib on Eliquis, pacemaker, HTN,   DM,and BPH and recent hospitalization at [facility] who was BIBA from [facility] for worsening mental status and mild hallucinations.   Patient is status post fall from 1 week ago 3/8/18 down a flight of steps.    Was hospitalized at [facility] where Asprin / Plavix was stopped, CT scans of   the brain negative for acute pathology, CT C-spine and Xrays of hands negative for   acute fracture. CT Chest found with bilateral pleural effusions but otherwise   patient was discharged to [facility]. Per family they noticed progressive decline in   mentation and function and were initially opposed to discharge to [facility]. Upon   presentation to [facility] patient did not participate in any activities and was   unable to have lucid conversations. + Mild "hallucinations" at [facility] per wife.   Prior to fall patient has been exhibiting signs of gradual decline in ADL's   "strength and appetite" and mentation "feeling more sleepy" for the past 2 months   possibly longer with multiple episodes of falls.      3/16- discharge was cancelled yesterday because of chest pain- +troponin- Cardiology consulted and patient was cleared for discahrge today- doubt it's MI and as per report pain was reproducible.        ROS:   All 10 systems reviewed and found to be negative with the exception of what has been described above.    Vital Signs Last 24 Hrs  T(C): 36.7 (16 Mar 2018 11:39), Max: 36.8 (15 Mar 2018 21:17)  T(F): 98 (16 Mar 2018 11:39), Max: 98.2 (15 Mar 2018 21:17)  HR: 70 (16 Mar 2018 11:39) (70 - 72)  BP: 114/47 (16 Mar 2018 11:39) (109/60 - 117/58)  BP(mean): --  RR: 16 (16 Mar 2018 11:39) (16 - 18)  SpO2: 99% (16 Mar 2018 11:39) (96% - 100%)      PHYSICAL EXAM:  GEN: up in chair, comfortable  HEENT: NC/AT, pupils equal and reactive, EOMI  CV:  +S1, +S2, RRR  RESP:   lungs clear to auscultation bilaterally, no wheeze, rales, rhonchi   GI:  abdomen soft, non-tender, non-distended, normoactive BS  RECTAL:  not examined  :  not examined  MSK:   normal muscle tone  EXT:  no LE edema,+ shoulder pain, unable to fully mobilize arm.   NEURO:  AAOX3, no focal neurological deficits. muscle   strength but equal bilaterally. somonolent  SKIN:  no rashes    PLAN  #1 Acute On chronic Encephalopathy - suspect metabolic encephalopathy in the   setting of suspected Dementia  - R/o CVA 3/13 CT scan unremarkable-   - As per neuro - Repeat CT brain 3/15, EEG - negative   - TSH WNL  - dig lvl wnl  - will check b12/folate lvls- normal  - ESR, CRP  - normal.  - fall precautions  - PT consult appreciated with recommendations for [facility]    #2 Hx of A. fib  - continue Eliquis, currently rate controlled, has PPM.   - cont Metoprolol.   - cont. digoxin    #3 Mild Pulmonary Edema on x-ray - chronic diastolic heart failure.   - resume home Lasix.    #4 Hx of CVA   - Aspirin / Plavix stopped during prior admission at [place] will keep off for   risk of bleed.   - continue eliquis     #5 BPH   - continue Flomax QHS.     #6 DM Type II   - holding Metformin- resume on discharge  - on sliding scale  - A1c. 5.8      #7 shoulder pain (unable to fully mobilize arm)  - x-ray of shoulder - negative    #8- Chest pain   - EKG noted  +troponin- trending down  _ cardiology consult with Dr Aceves- as per service - highly unlikely that its MI- pain was reproducible  - not a candidate for aggressive intervention  - will continue BB and aspirin  - no clinical evidence of ischemia  - f/u as an outpatient.     Case d/w team on IDR. Plan for discharge to rehab today.     Attending Statement:  40 minutes spent on total encounter; more than 50% of the visit was spent counseling and/or coordinating care by the attending physician.  Patient seen and examined with GREY [place].  Agree with physical exam and assessment and plan.

## 2018-03-16 NOTE — PROGRESS NOTE ADULT - ASSESSMENT
Assessment and Recommendation:   · Assessment		  This is a 86 y/o male with a PMHx of A. fib on Eliquis w/ PPM, hx of CVA, DM, CAD, CHF and other comorbidities BIBA from [facility] for persistent altered mental status.     Acute On chronic Encephalopathy - suspect metabolic encephalopathy in the setting of suspected Dementia  R/o CVA  Rec Repeat CT brain.  EEG reported as normal.  Correct underlying metabolic causes   Check b12,folate,TSH.  Adjust/ simplify meds if possible.  unable to reach his wife will try to contact.  PT/OT for OOB/ ambulate.  If Stable medically D/c planning to Short term rehab.  consider adding namenda 5 mg twice a day if no contraindications.  F/u in office after Dc from rehab.

## 2018-03-16 NOTE — CONSULT NOTE ADULT - PROBLEM SELECTOR RECOMMENDATION 9
Paced rhythm continue anticoagulation. Pt's with afib have an increased incidence of dementia possibly from microemboli.

## 2018-03-18 LAB
CULTURE RESULTS: SIGNIFICANT CHANGE UP
CULTURE RESULTS: SIGNIFICANT CHANGE UP
SPECIMEN SOURCE: SIGNIFICANT CHANGE UP
SPECIMEN SOURCE: SIGNIFICANT CHANGE UP

## 2018-03-21 DIAGNOSIS — F07.81 POSTCONCUSSIONAL SYNDROME: ICD-10-CM

## 2018-03-21 DIAGNOSIS — I48.2 CHRONIC ATRIAL FIBRILLATION: ICD-10-CM

## 2018-03-21 DIAGNOSIS — Z86.73 PERSONAL HISTORY OF TRANSIENT ISCHEMIC ATTACK (TIA), AND CEREBRAL INFARCTION WITHOUT RESIDUAL DEFICITS: ICD-10-CM

## 2018-03-21 DIAGNOSIS — Z79.84 LONG TERM (CURRENT) USE OF ORAL HYPOGLYCEMIC DRUGS: ICD-10-CM

## 2018-03-21 DIAGNOSIS — I11.0 HYPERTENSIVE HEART DISEASE WITH HEART FAILURE: ICD-10-CM

## 2018-03-21 DIAGNOSIS — R07.89 OTHER CHEST PAIN: ICD-10-CM

## 2018-03-21 DIAGNOSIS — R74.8 ABNORMAL LEVELS OF OTHER SERUM ENZYMES: ICD-10-CM

## 2018-03-21 DIAGNOSIS — Z79.02 LONG TERM (CURRENT) USE OF ANTITHROMBOTICS/ANTIPLATELETS: ICD-10-CM

## 2018-03-21 DIAGNOSIS — Z66 DO NOT RESUSCITATE: ICD-10-CM

## 2018-03-21 DIAGNOSIS — F03.90 UNSPECIFIED DEMENTIA WITHOUT BEHAVIORAL DISTURBANCE: ICD-10-CM

## 2018-03-21 DIAGNOSIS — F05 DELIRIUM DUE TO KNOWN PHYSIOLOGICAL CONDITION: ICD-10-CM

## 2018-03-21 DIAGNOSIS — R41.82 ALTERED MENTAL STATUS, UNSPECIFIED: ICD-10-CM

## 2018-03-21 DIAGNOSIS — Z91.81 HISTORY OF FALLING: ICD-10-CM

## 2018-03-21 DIAGNOSIS — I25.2 OLD MYOCARDIAL INFARCTION: ICD-10-CM

## 2018-03-21 DIAGNOSIS — N40.0 BENIGN PROSTATIC HYPERPLASIA WITHOUT LOWER URINARY TRACT SYMPTOMS: ICD-10-CM

## 2018-03-21 DIAGNOSIS — I25.10 ATHEROSCLEROTIC HEART DISEASE OF NATIVE CORONARY ARTERY WITHOUT ANGINA PECTORIS: ICD-10-CM

## 2018-03-21 DIAGNOSIS — Z95.0 PRESENCE OF CARDIAC PACEMAKER: ICD-10-CM

## 2018-03-21 DIAGNOSIS — I50.32 CHRONIC DIASTOLIC (CONGESTIVE) HEART FAILURE: ICD-10-CM

## 2018-03-21 DIAGNOSIS — G93.41 METABOLIC ENCEPHALOPATHY: ICD-10-CM

## 2018-03-21 DIAGNOSIS — E11.9 TYPE 2 DIABETES MELLITUS WITHOUT COMPLICATIONS: ICD-10-CM

## 2018-08-08 ENCOUNTER — INPATIENT (INPATIENT)
Facility: HOSPITAL | Age: 83
LOS: 5 days | Discharge: NURSING, REHAB OR LONG-TERM CARE | End: 2018-08-14
Attending: INTERNAL MEDICINE | Admitting: INTERNAL MEDICINE
Payer: MEDICARE

## 2018-08-08 VITALS
TEMPERATURE: 99 F | DIASTOLIC BLOOD PRESSURE: 78 MMHG | OXYGEN SATURATION: 96 % | HEIGHT: 71 IN | SYSTOLIC BLOOD PRESSURE: 124 MMHG | RESPIRATION RATE: 18 BRPM | WEIGHT: 160.06 LBS | HEART RATE: 66 BPM

## 2018-08-08 LAB
ALBUMIN SERPL ELPH-MCNC: 3.1 G/DL — LOW (ref 3.3–5)
ALP SERPL-CCNC: 62 U/L — SIGNIFICANT CHANGE UP (ref 40–120)
ALT FLD-CCNC: 12 U/L — SIGNIFICANT CHANGE UP (ref 12–78)
ANION GAP SERPL CALC-SCNC: 7 MMOL/L — SIGNIFICANT CHANGE UP (ref 5–17)
APTT BLD: 33 SEC — SIGNIFICANT CHANGE UP (ref 27.5–37.4)
AST SERPL-CCNC: 19 U/L — SIGNIFICANT CHANGE UP (ref 15–37)
BASOPHILS # BLD AUTO: 0.05 K/UL — SIGNIFICANT CHANGE UP (ref 0–0.2)
BASOPHILS NFR BLD AUTO: 0.4 % — SIGNIFICANT CHANGE UP (ref 0–2)
BILIRUB SERPL-MCNC: 1.3 MG/DL — HIGH (ref 0.2–1.2)
BUN SERPL-MCNC: 22 MG/DL — SIGNIFICANT CHANGE UP (ref 7–23)
CALCIUM SERPL-MCNC: 8.8 MG/DL — SIGNIFICANT CHANGE UP (ref 8.5–10.1)
CHLORIDE SERPL-SCNC: 97 MMOL/L — SIGNIFICANT CHANGE UP (ref 96–108)
CO2 SERPL-SCNC: 32 MMOL/L — HIGH (ref 22–31)
CREAT SERPL-MCNC: 0.77 MG/DL — SIGNIFICANT CHANGE UP (ref 0.5–1.3)
EOSINOPHIL # BLD AUTO: 0.08 K/UL — SIGNIFICANT CHANGE UP (ref 0–0.5)
EOSINOPHIL NFR BLD AUTO: 0.7 % — SIGNIFICANT CHANGE UP (ref 0–6)
GLUCOSE SERPL-MCNC: 140 MG/DL — HIGH (ref 70–99)
HCT VFR BLD CALC: 38 % — LOW (ref 39–50)
HGB BLD-MCNC: 12.4 G/DL — LOW (ref 13–17)
IMM GRANULOCYTES NFR BLD AUTO: 0.5 % — SIGNIFICANT CHANGE UP (ref 0–1.5)
INR BLD: 1.53 RATIO — HIGH (ref 0.88–1.16)
LACTATE SERPL-SCNC: 1.8 MMOL/L — SIGNIFICANT CHANGE UP (ref 0.7–2)
LYMPHOCYTES # BLD AUTO: 0.58 K/UL — LOW (ref 1–3.3)
LYMPHOCYTES # BLD AUTO: 5.1 % — LOW (ref 13–44)
MCHC RBC-ENTMCNC: 26.7 PG — LOW (ref 27–34)
MCHC RBC-ENTMCNC: 32.6 GM/DL — SIGNIFICANT CHANGE UP (ref 32–36)
MCV RBC AUTO: 81.9 FL — SIGNIFICANT CHANGE UP (ref 80–100)
MONOCYTES # BLD AUTO: 0.75 K/UL — SIGNIFICANT CHANGE UP (ref 0–0.9)
MONOCYTES NFR BLD AUTO: 6.6 % — SIGNIFICANT CHANGE UP (ref 2–14)
NEUTROPHILS # BLD AUTO: 9.87 K/UL — HIGH (ref 1.8–7.4)
NEUTROPHILS NFR BLD AUTO: 86.7 % — HIGH (ref 43–77)
NRBC # BLD: 0 /100 WBCS — SIGNIFICANT CHANGE UP (ref 0–0)
PLATELET # BLD AUTO: 152 K/UL — SIGNIFICANT CHANGE UP (ref 150–400)
POTASSIUM SERPL-MCNC: 4.2 MMOL/L — SIGNIFICANT CHANGE UP (ref 3.5–5.3)
POTASSIUM SERPL-SCNC: 4.2 MMOL/L — SIGNIFICANT CHANGE UP (ref 3.5–5.3)
PROT SERPL-MCNC: 7.1 GM/DL — SIGNIFICANT CHANGE UP (ref 6–8.3)
PROTHROM AB SERPL-ACNC: 16.7 SEC — HIGH (ref 9.8–12.7)
RBC # BLD: 4.64 M/UL — SIGNIFICANT CHANGE UP (ref 4.2–5.8)
RBC # FLD: 17.6 % — HIGH (ref 10.3–14.5)
SODIUM SERPL-SCNC: 136 MMOL/L — SIGNIFICANT CHANGE UP (ref 135–145)
TROPONIN I SERPL-MCNC: 0.04 NG/ML — SIGNIFICANT CHANGE UP (ref 0.01–0.04)
WBC # BLD: 11.39 K/UL — HIGH (ref 3.8–10.5)
WBC # FLD AUTO: 11.39 K/UL — HIGH (ref 3.8–10.5)

## 2018-08-08 PROCEDURE — 71045 X-RAY EXAM CHEST 1 VIEW: CPT | Mod: 26

## 2018-08-08 PROCEDURE — 93010 ELECTROCARDIOGRAM REPORT: CPT

## 2018-08-08 RX ORDER — ASPIRIN/CALCIUM CARB/MAGNESIUM 324 MG
325 TABLET ORAL ONCE
Qty: 0 | Refills: 0 | Status: COMPLETED | OUTPATIENT
Start: 2018-08-08 | End: 2018-08-08

## 2018-08-08 RX ORDER — SODIUM CHLORIDE 9 MG/ML
3 INJECTION INTRAMUSCULAR; INTRAVENOUS; SUBCUTANEOUS ONCE
Qty: 0 | Refills: 0 | Status: COMPLETED | OUTPATIENT
Start: 2018-08-08 | End: 2018-08-08

## 2018-08-08 RX ADMIN — Medication 325 MILLIGRAM(S): at 21:51

## 2018-08-08 RX ADMIN — SODIUM CHLORIDE 3 MILLILITER(S): 9 INJECTION INTRAMUSCULAR; INTRAVENOUS; SUBCUTANEOUS at 21:52

## 2018-08-08 NOTE — ED ADULT TRIAGE NOTE - CHIEF COMPLAINT QUOTE
Pt presents to the ED from [facility] for chest pain. EMS states pt also complains of generalized body pain. Pt given 1 Nitro 0.4 and 325mg of ASA PTA.

## 2018-08-08 NOTE — ED ADULT NURSE NOTE - NSIMPLEMENTINTERV_GEN_ALL_ED
Implemented All Fall with Harm Risk Interventions:  [place] to call system. Call bell, personal items and telephone within reach. Instruct patient to call for assistance. Room bathroom lighting operational. Non-slip footwear when patient is off stretcher. Physically safe environment: no spills, clutter or unnecessary equipment. Stretcher in lowest position, wheels locked, appropriate side rails in place. Provide visual cue, wrist band, yellow gown, etc. Monitor gait and stability. Monitor for mental status changes and reorient to person, place, and time. Review medications for side effects contributing to fall risk. Reinforce activity limits and safety measures with patient and family. Provide visual clues: red socks.

## 2018-08-08 NOTE — ED ADULT NURSE NOTE - OBJECTIVE STATEMENT
Patient BIBA from [facility] for chest pain. Patient alert to self only. Patient denies chest discomfort upon arrival. Patient reports slight back pain. Rectal temp 102.8

## 2018-08-08 NOTE — ED ADULT NURSE NOTE - CHIEF COMPLAINT QUOTE
Pt presents to the ED from [facility] living for chest pain. EMS states pt also complains of generalized body pain. Pt given 1 Nitro 0.4 and 325mg of ASA PTA.

## 2018-08-09 DIAGNOSIS — R07.9 CHEST PAIN, UNSPECIFIED: ICD-10-CM

## 2018-08-09 DIAGNOSIS — I48.91 UNSPECIFIED ATRIAL FIBRILLATION: ICD-10-CM

## 2018-08-09 DIAGNOSIS — I25.118 ATHEROSCLEROTIC HEART DISEASE OF NATIVE CORONARY ARTERY WITH OTHER FORMS OF ANGINA PECTORIS: ICD-10-CM

## 2018-08-09 LAB
ANION GAP SERPL CALC-SCNC: 9 MMOL/L — SIGNIFICANT CHANGE UP (ref 5–17)
APPEARANCE UR: ABNORMAL
BACTERIA # UR AUTO: ABNORMAL
BILIRUB UR-MCNC: NEGATIVE — SIGNIFICANT CHANGE UP
BUN SERPL-MCNC: 21 MG/DL — SIGNIFICANT CHANGE UP (ref 7–23)
CALCIUM SERPL-MCNC: 8.5 MG/DL — SIGNIFICANT CHANGE UP (ref 8.5–10.1)
CHLORIDE SERPL-SCNC: 99 MMOL/L — SIGNIFICANT CHANGE UP (ref 96–108)
CO2 SERPL-SCNC: 32 MMOL/L — HIGH (ref 22–31)
COLOR SPEC: YELLOW — SIGNIFICANT CHANGE UP
CREAT SERPL-MCNC: 0.65 MG/DL — SIGNIFICANT CHANGE UP (ref 0.5–1.3)
DIFF PNL FLD: ABNORMAL
EPI CELLS # UR: SIGNIFICANT CHANGE UP
GLUCOSE BLDC GLUCOMTR-MCNC: 106 MG/DL — HIGH (ref 70–99)
GLUCOSE BLDC GLUCOMTR-MCNC: 121 MG/DL — HIGH (ref 70–99)
GLUCOSE BLDC GLUCOMTR-MCNC: 170 MG/DL — HIGH (ref 70–99)
GLUCOSE SERPL-MCNC: 105 MG/DL — HIGH (ref 70–99)
GLUCOSE UR QL: NEGATIVE MG/DL — SIGNIFICANT CHANGE UP
HCT VFR BLD CALC: 36.3 % — LOW (ref 39–50)
HGB BLD-MCNC: 12.1 G/DL — LOW (ref 13–17)
KETONES UR-MCNC: ABNORMAL
LEUKOCYTE ESTERASE UR-ACNC: ABNORMAL
MCHC RBC-ENTMCNC: 27.9 PG — SIGNIFICANT CHANGE UP (ref 27–34)
MCHC RBC-ENTMCNC: 33.3 GM/DL — SIGNIFICANT CHANGE UP (ref 32–36)
MCV RBC AUTO: 83.8 FL — SIGNIFICANT CHANGE UP (ref 80–100)
NITRITE UR-MCNC: NEGATIVE — SIGNIFICANT CHANGE UP
NRBC # BLD: 0 /100 WBCS — SIGNIFICANT CHANGE UP (ref 0–0)
PH UR: 5 — SIGNIFICANT CHANGE UP (ref 5–8)
PLATELET # BLD AUTO: 144 K/UL — LOW (ref 150–400)
POTASSIUM SERPL-MCNC: 3.8 MMOL/L — SIGNIFICANT CHANGE UP (ref 3.5–5.3)
POTASSIUM SERPL-SCNC: 3.8 MMOL/L — SIGNIFICANT CHANGE UP (ref 3.5–5.3)
PROT UR-MCNC: 100 MG/DL
RBC # BLD: 4.33 M/UL — SIGNIFICANT CHANGE UP (ref 4.2–5.8)
RBC # FLD: 17.6 % — HIGH (ref 10.3–14.5)
RBC CASTS # UR COMP ASSIST: ABNORMAL /HPF (ref 0–4)
SODIUM SERPL-SCNC: 140 MMOL/L — SIGNIFICANT CHANGE UP (ref 135–145)
SP GR SPEC: 1.02 — SIGNIFICANT CHANGE UP (ref 1.01–1.02)
TROPONIN I SERPL-MCNC: 0.04 NG/ML — SIGNIFICANT CHANGE UP (ref 0.01–0.04)
TROPONIN I SERPL-MCNC: 0.05 NG/ML — HIGH (ref 0.01–0.04)
UROBILINOGEN FLD QL: 4 MG/DL
WBC # BLD: 7.39 K/UL — SIGNIFICANT CHANGE UP (ref 3.8–10.5)
WBC # FLD AUTO: 7.39 K/UL — SIGNIFICANT CHANGE UP (ref 3.8–10.5)
WBC UR QL: >50

## 2018-08-09 PROCEDURE — 93283 PRGRMG EVAL IMPLANTABLE DFB: CPT | Mod: 26

## 2018-08-09 PROCEDURE — 99223 1ST HOSP IP/OBS HIGH 75: CPT

## 2018-08-09 PROCEDURE — 99285 EMERGENCY DEPT VISIT HI MDM: CPT

## 2018-08-09 PROCEDURE — 93010 ELECTROCARDIOGRAM REPORT: CPT

## 2018-08-09 PROCEDURE — 93306 TTE W/DOPPLER COMPLETE: CPT | Mod: 26

## 2018-08-09 RX ORDER — ESCITALOPRAM OXALATE 10 MG/1
10 TABLET, FILM COATED ORAL DAILY
Qty: 0 | Refills: 0 | Status: DISCONTINUED | OUTPATIENT
Start: 2018-08-09 | End: 2018-08-14

## 2018-08-09 RX ORDER — ACETAMINOPHEN 500 MG
650 TABLET ORAL ONCE
Qty: 0 | Refills: 0 | Status: COMPLETED | OUTPATIENT
Start: 2018-08-09 | End: 2018-08-09

## 2018-08-09 RX ORDER — GABAPENTIN 400 MG/1
1 CAPSULE ORAL
Qty: 0 | Refills: 0 | COMMUNITY

## 2018-08-09 RX ORDER — DOCUSATE SODIUM 100 MG
100 CAPSULE ORAL THREE TIMES A DAY
Qty: 0 | Refills: 0 | Status: DISCONTINUED | OUTPATIENT
Start: 2018-08-09 | End: 2018-08-14

## 2018-08-09 RX ORDER — SODIUM CHLORIDE 9 MG/ML
1000 INJECTION, SOLUTION INTRAVENOUS
Qty: 0 | Refills: 0 | Status: DISCONTINUED | OUTPATIENT
Start: 2018-08-09 | End: 2018-08-14

## 2018-08-09 RX ORDER — CHOLECALCIFEROL (VITAMIN D3) 125 MCG
1 CAPSULE ORAL
Qty: 0 | Refills: 0 | COMMUNITY

## 2018-08-09 RX ORDER — CEFTRIAXONE 500 MG/1
1000 INJECTION, POWDER, FOR SOLUTION INTRAMUSCULAR; INTRAVENOUS EVERY 24 HOURS
Qty: 0 | Refills: 0 | Status: DISCONTINUED | OUTPATIENT
Start: 2018-08-10 | End: 2018-08-12

## 2018-08-09 RX ORDER — DEXTROSE 50 % IN WATER 50 %
15 SYRINGE (ML) INTRAVENOUS ONCE
Qty: 0 | Refills: 0 | Status: DISCONTINUED | OUTPATIENT
Start: 2018-08-09 | End: 2018-08-14

## 2018-08-09 RX ORDER — CEFTRIAXONE 500 MG/1
1000 INJECTION, POWDER, FOR SOLUTION INTRAMUSCULAR; INTRAVENOUS ONCE
Qty: 0 | Refills: 0 | Status: COMPLETED | OUTPATIENT
Start: 2018-08-09 | End: 2018-08-09

## 2018-08-09 RX ORDER — POTASSIUM CHLORIDE 20 MEQ
20 PACKET (EA) ORAL
Qty: 0 | Refills: 0 | COMMUNITY

## 2018-08-09 RX ORDER — FUROSEMIDE 40 MG
60 TABLET ORAL
Qty: 0 | Refills: 0 | COMMUNITY

## 2018-08-09 RX ORDER — ESCITALOPRAM OXALATE 10 MG/1
1 TABLET, FILM COATED ORAL
Qty: 0 | Refills: 0 | COMMUNITY

## 2018-08-09 RX ORDER — INSULIN LISPRO 100/ML
VIAL (ML) SUBCUTANEOUS AT BEDTIME
Qty: 0 | Refills: 0 | Status: DISCONTINUED | OUTPATIENT
Start: 2018-08-09 | End: 2018-08-14

## 2018-08-09 RX ORDER — DEXTROSE 50 % IN WATER 50 %
12.5 SYRINGE (ML) INTRAVENOUS ONCE
Qty: 0 | Refills: 0 | Status: DISCONTINUED | OUTPATIENT
Start: 2018-08-09 | End: 2018-08-14

## 2018-08-09 RX ORDER — METOPROLOL TARTRATE 50 MG
12.5 TABLET ORAL
Qty: 0 | Refills: 0 | Status: DISCONTINUED | OUTPATIENT
Start: 2018-08-09 | End: 2018-08-11

## 2018-08-09 RX ORDER — DOXAZOSIN MESYLATE 4 MG
2 TABLET ORAL AT BEDTIME
Qty: 0 | Refills: 0 | Status: DISCONTINUED | OUTPATIENT
Start: 2018-08-09 | End: 2018-08-14

## 2018-08-09 RX ORDER — MAGNESIUM HYDROXIDE 400 MG/1
30 TABLET, CHEWABLE ORAL DAILY
Qty: 0 | Refills: 0 | Status: DISCONTINUED | OUTPATIENT
Start: 2018-08-09 | End: 2018-08-14

## 2018-08-09 RX ORDER — DOXAZOSIN MESYLATE 4 MG
1 TABLET ORAL
Qty: 0 | Refills: 0 | COMMUNITY

## 2018-08-09 RX ORDER — CEFTRIAXONE 500 MG/1
INJECTION, POWDER, FOR SOLUTION INTRAMUSCULAR; INTRAVENOUS
Qty: 0 | Refills: 0 | Status: DISCONTINUED | OUTPATIENT
Start: 2018-08-09 | End: 2018-08-12

## 2018-08-09 RX ORDER — IPRATROPIUM/ALBUTEROL SULFATE 18-103MCG
3 AEROSOL WITH ADAPTER (GRAM) INHALATION
Qty: 0 | Refills: 0 | COMMUNITY

## 2018-08-09 RX ORDER — INSULIN ASPART 100 [IU]/ML
0 INJECTION, SOLUTION SUBCUTANEOUS
Qty: 0 | Refills: 0 | COMMUNITY

## 2018-08-09 RX ORDER — GABAPENTIN 400 MG/1
300 CAPSULE ORAL THREE TIMES A DAY
Qty: 0 | Refills: 0 | Status: DISCONTINUED | OUTPATIENT
Start: 2018-08-09 | End: 2018-08-14

## 2018-08-09 RX ORDER — ACETAMINOPHEN 500 MG
650 TABLET ORAL EVERY 6 HOURS
Qty: 0 | Refills: 0 | Status: DISCONTINUED | OUTPATIENT
Start: 2018-08-09 | End: 2018-08-14

## 2018-08-09 RX ORDER — INSULIN LISPRO 100/ML
VIAL (ML) SUBCUTANEOUS
Qty: 0 | Refills: 0 | Status: DISCONTINUED | OUTPATIENT
Start: 2018-08-09 | End: 2018-08-14

## 2018-08-09 RX ORDER — DEXTROSE 50 % IN WATER 50 %
25 SYRINGE (ML) INTRAVENOUS ONCE
Qty: 0 | Refills: 0 | Status: DISCONTINUED | OUTPATIENT
Start: 2018-08-09 | End: 2018-08-14

## 2018-08-09 RX ORDER — ASPIRIN/CALCIUM CARB/MAGNESIUM 324 MG
81 TABLET ORAL DAILY
Qty: 0 | Refills: 0 | Status: DISCONTINUED | OUTPATIENT
Start: 2018-08-09 | End: 2018-08-14

## 2018-08-09 RX ORDER — ACETAMINOPHEN 500 MG
1 TABLET ORAL
Qty: 0 | Refills: 0 | COMMUNITY

## 2018-08-09 RX ORDER — CEFTRIAXONE 500 MG/1
INJECTION, POWDER, FOR SOLUTION INTRAMUSCULAR; INTRAVENOUS
Qty: 0 | Refills: 0 | Status: DISCONTINUED | OUTPATIENT
Start: 2018-08-09 | End: 2018-08-09

## 2018-08-09 RX ORDER — FUROSEMIDE 40 MG
60 TABLET ORAL DAILY
Qty: 0 | Refills: 0 | Status: DISCONTINUED | OUTPATIENT
Start: 2018-08-09 | End: 2018-08-14

## 2018-08-09 RX ORDER — METOPROLOL TARTRATE 50 MG
1 TABLET ORAL
Qty: 0 | Refills: 0 | COMMUNITY

## 2018-08-09 RX ORDER — SODIUM CHLORIDE 9 MG/ML
250 INJECTION INTRAMUSCULAR; INTRAVENOUS; SUBCUTANEOUS
Qty: 0 | Refills: 0 | Status: COMPLETED | OUTPATIENT
Start: 2018-08-09 | End: 2018-08-09

## 2018-08-09 RX ORDER — FAMOTIDINE 10 MG/ML
20 INJECTION INTRAVENOUS DAILY
Qty: 0 | Refills: 0 | Status: DISCONTINUED | OUTPATIENT
Start: 2018-08-09 | End: 2018-08-14

## 2018-08-09 RX ORDER — FUROSEMIDE 40 MG
1 TABLET ORAL
Qty: 0 | Refills: 0 | COMMUNITY

## 2018-08-09 RX ORDER — APIXABAN 2.5 MG/1
2.5 TABLET, FILM COATED ORAL EVERY 12 HOURS
Qty: 0 | Refills: 0 | Status: DISCONTINUED | OUTPATIENT
Start: 2018-08-09 | End: 2018-08-14

## 2018-08-09 RX ORDER — DONEPEZIL HYDROCHLORIDE 10 MG/1
1 TABLET, FILM COATED ORAL
Qty: 0 | Refills: 0 | COMMUNITY

## 2018-08-09 RX ORDER — DIGOXIN 250 MCG
0.12 TABLET ORAL DAILY
Qty: 0 | Refills: 0 | Status: DISCONTINUED | OUTPATIENT
Start: 2018-08-09 | End: 2018-08-14

## 2018-08-09 RX ORDER — MORPHINE SULFATE 50 MG/1
2 CAPSULE, EXTENDED RELEASE ORAL ONCE
Qty: 0 | Refills: 0 | Status: DISCONTINUED | OUTPATIENT
Start: 2018-08-09 | End: 2018-08-09

## 2018-08-09 RX ORDER — FAMOTIDINE 10 MG/ML
1 INJECTION INTRAVENOUS
Qty: 0 | Refills: 0 | COMMUNITY

## 2018-08-09 RX ORDER — METOPROLOL TARTRATE 50 MG
0.5 TABLET ORAL
Qty: 0 | Refills: 0 | COMMUNITY

## 2018-08-09 RX ORDER — GLUCAGON INJECTION, SOLUTION 0.5 MG/.1ML
1 INJECTION, SOLUTION SUBCUTANEOUS ONCE
Qty: 0 | Refills: 0 | Status: DISCONTINUED | OUTPATIENT
Start: 2018-08-09 | End: 2018-08-14

## 2018-08-09 RX ADMIN — SODIUM CHLORIDE 50 MILLILITER(S): 9 INJECTION INTRAMUSCULAR; INTRAVENOUS; SUBCUTANEOUS at 13:12

## 2018-08-09 RX ADMIN — CEFTRIAXONE 1000 MILLIGRAM(S): 500 INJECTION, POWDER, FOR SOLUTION INTRAMUSCULAR; INTRAVENOUS at 09:16

## 2018-08-09 RX ADMIN — Medication 81 MILLIGRAM(S): at 11:53

## 2018-08-09 RX ADMIN — APIXABAN 2.5 MILLIGRAM(S): 2.5 TABLET, FILM COATED ORAL at 18:59

## 2018-08-09 RX ADMIN — GABAPENTIN 300 MILLIGRAM(S): 400 CAPSULE ORAL at 21:23

## 2018-08-09 RX ADMIN — FAMOTIDINE 20 MILLIGRAM(S): 10 INJECTION INTRAVENOUS at 11:53

## 2018-08-09 RX ADMIN — MORPHINE SULFATE 2 MILLIGRAM(S): 50 CAPSULE, EXTENDED RELEASE ORAL at 12:27

## 2018-08-09 RX ADMIN — Medication 2 MILLIGRAM(S): at 21:23

## 2018-08-09 RX ADMIN — Medication 100 MILLIGRAM(S): at 21:22

## 2018-08-09 RX ADMIN — Medication 0.12 MILLIGRAM(S): at 11:54

## 2018-08-09 RX ADMIN — MORPHINE SULFATE 2 MILLIGRAM(S): 50 CAPSULE, EXTENDED RELEASE ORAL at 12:57

## 2018-08-09 RX ADMIN — Medication 650 MILLIGRAM(S): at 07:19

## 2018-08-09 NOTE — SWALLOW BEDSIDE ASSESSMENT ADULT - PHARYNGEAL PHASE
Swallow trigger was timely to slightly latent and laryngeal lift on palpation during swallowing trials was mildly to moderately decreased but felt to be grossly functional with the above modified PO types. No behavioral aspiration signs exhibited with honey thick liquids and pureed foods.

## 2018-08-09 NOTE — ED PROVIDER NOTE - OBJECTIVE STATEMENT
88 year old male with PMH of dementia oriented to self, DM, HTN, HLD, CHF, Afib on Eliquis, ppm, patient of [facility] assisted living, BIBA with cc of chest pain. No palpitation. No abdominal pain. Pain occasionally radiating to arms. No fever or chills. Complains of diffuse myalgia. No vomiting. No leg pain. No recent exotic travel. No visual or focal neurological complaints. No rash.

## 2018-08-09 NOTE — SWALLOW BEDSIDE ASSESSMENT ADULT - ORAL PREPARATORY PHASE
Pt was distractible but willingly accepted PO and demonstrated functional labial grading on utensils.

## 2018-08-09 NOTE — H&P ADULT - ASSESSMENT
89 y/o male with a PMHx of A. fib on Eliquis w/ PPM, hx of CVA, DM, CAD, CHF and other comorbidities brought from [facility] for c/o chest pain and admitted with :    1) Chest Pain: No acute MI ; likely sec to GERD  admit to tele  echo  cardio consult  cont asa, BB, eliquis    2) Fever + leukocytosis:  sec to UTI  start rocephin   urine cx and blood cx prior to ABX  tylenol prn    3) DM 2:  hold metformin  ISS    4) Hx oF A fib:  cont eliquis    5) Hx of CHF:   cont lasix    6) DVT PPX:  on eliquis    poc discussed with pt, team

## 2018-08-09 NOTE — SWALLOW BEDSIDE ASSESSMENT ADULT - ASR SWALLOW LINGUAL MOBILITY
Limited probes revealed that effort was reduced when executing volitional lingual actions but no overt tongue focality was apparent

## 2018-08-09 NOTE — H&P ADULT - HISTORY OF PRESENT ILLNESS
89 y/o male with a PMHx of A. priti on Eliquis w/ PPM, hx of CVA, DM, CAD, CHF and other comorbidities brought from [facility] for c/o chest pain in the lower central chest radiating to b/l arms. Also found to have temp of 102.8 in ER last night.   No active chest pain at this time.   No ABX given in ER.

## 2018-08-09 NOTE — H&P ADULT - NSHPPHYSICALEXAM_GEN_ALL_CORE
PHYSICAL EXAM:    Daily Height in cm: 180.34 (08 Aug 2018 21:13)    Daily     ICU Vital Signs Last 24 Hrs  T(C): 36.8 (09 Aug 2018 06:05), Max: 39.3 (08 Aug 2018 21:45)  T(F): 98.2 (09 Aug 2018 06:05), Max: 102.8 (08 Aug 2018 21:45)  HR: 70 (09 Aug 2018 06:05) (66 - 73)  BP: 112/52 (09 Aug 2018 06:05) (106/86 - 124/78)  BP(mean): --  ABP: --  ABP(mean): --  RR: 22 (08 Aug 2018 22:32) (18 - 22)  SpO2: 97% (09 Aug 2018 06:05) (96% - 98%)      Constitutional: Weak appearing  HEENT: Atraumatic, BREN, Normal, No congestion  Respiratory: Breath Sounds decreased b/l, no rhonchi/wheeze  Cardiovascular: N S1S2; STEFFANIE present  Gastrointestinal: Abdomen soft, non tender, Bowel Sounds present  Extremities: No edema, peripheral pulses present  Neurological: AAO x 1, no gross focal motor deficits  Skin: Non cellulitic, no rash, ulcers  Breasts: Deferred  Genitourinary: deferred  Rectal: Deferred

## 2018-08-09 NOTE — SWALLOW BEDSIDE ASSESSMENT ADULT - COMMENTS
The patient was admitted to  from [facility]. Hospital course is notable for chest pain which improved, leukocytosis and UTI. This profile is superimposed upon a history of dementia, depression, chronic Oropharyngeal Dysphagia, CAD s/p CABG, CHF, A-Fib, COPD, DM, PVD, GERD, past CVA, prior PNA/thoracentesis and past sx for a popliteal synovial cyst.

## 2018-08-09 NOTE — SWALLOW BEDSIDE ASSESSMENT ADULT - SWALLOW EVAL: DIAGNOSIS
1) Pt demonstrates chronic Oropharyngeal Dysphagia which subjectively appeared to be a grossly functional condition with a limited inventory of restricted modified food textures. Dysphagia pre-existed this hospitalization in setting of Dementia/cardiopulmonary dz/prior CVA. Oropharyngeal Swallowing potential is felt to be maximized.   2) The pt was alert and interactive but communicatively passive/internally distractible at times. He was able to verbalize during communicative probes and in conversation 1) Pt demonstrates chronic Oropharyngeal Dysphagia which subjectively appeared to be a grossly functional condition with a limited inventory of restricted modified food textures. Dysphagia pre-existed this hospitalization in setting of Dementia/cardiopulmonary dz/prior CVA. Oropharyngeal Swallowing potential is felt to be maximized.   2) The pt was alert and interactive but communicatively passive/internally distractible at times. He was able to verbalize during communicative probes and in conversation. At these times, his utterances were produced without an overt primary motor speech or primary linguistic pathology. However, his verbalizations were often brief while reflecting variably decreased orientation/STM/insight consistent with baseline Cognitive Dysfunction associated with dementia. Note that pt was able to verbalize his needs at times despite Cognitive Dysfunction and he is likely at or close to communicative baseline. 1) Pt demonstrates chronic Oropharyngeal Dysphagia which subjectively appears to be a grossly functional condition with a limited inventory of restricted modified food textures when he is in an alert state. Dysphagia pre-existed this hospitalization in setting of Dementia/cardiopulmonary dz/prior CVA. Oropharyngeal Swallowing potential is felt to be maximized.   2) The pt was alert and interactive but communicatively passive/internally distractible at times. He was able to verbalize during communicative probes and in conversation. At these times, his utterances were produced without an overt primary motor speech or primary linguistic pathology. However, his verbalizations were often brief while reflecting variably decreased orientation/STM/insight consistent with baseline Cognitive Dysfunction associated with dementia. Note that pt was able to verbalize his needs at times despite Cognitive Dysfunction and he is likely at or close to communicative baseline.

## 2018-08-09 NOTE — SWALLOW BEDSIDE ASSESSMENT ADULT - ASR SWALLOW LABIAL MOBILITY
Limited probes revealed that effort was reduced when executing volitional labial actions but no overt lip focality was apparent.

## 2018-08-09 NOTE — SWALLOW BEDSIDE ASSESSMENT ADULT - ORAL PHASE
Bolus formation/transfer were achieved via mildly to moderately prolonged but functional lingual pumping actions. Piecemeal deglutition was evident. Slight tongue debris noted with pureed foods only.

## 2018-08-09 NOTE — SWALLOW BEDSIDE ASSESSMENT ADULT - ADDITIONAL RECOMMENDATIONS
1) Nutrition follow up. Profile places him at nutrition risk. Provide supplements as needed. Do not feel that placement of a feeding tube would necessarily enhance his life quality given his advanced age/dementia/irreversible Dysphagia.    2) Pt has a MOLST form in place. Consult Palliative Care if needed.

## 2018-08-09 NOTE — ED PROVIDER NOTE - MEDICAL DECISION MAKING DETAILS
Cain THOMPSON: Labs, xr, and admission. Patient admitted for further inpatient care (r/o ACS, r/o PNA, ppm interrogation, etc. ), hospitalist admission is appreciated.

## 2018-08-09 NOTE — SWALLOW BEDSIDE ASSESSMENT ADULT - SWALLOW EVAL: CRITERIA FOR SKILLED INTERVENTION MET
ACUTE SPEECH PATHOLOGY INTERVENTION IS NOT CLINICALLY WARRANTED AND WOULD NOT . NOTE THAT PT'S DYSPHAGIA AND COGNITIVE DEFICITS ARE CHRONIC PRE-EXISTING IRREVERSIBLE CONDITIONS. MOREOVER, THE PATIENT IS NOT A VIABLE THERAPY CANDIDATE NONETHELESS GIVEN THE SEVERITY OF HIS PRE-EXISTING COGNITIVE DEFICITS FROM DEMENTIA. GIVEN ABOVE, WILL NOT ACTIVELY FOLLOW WHILE IN ACUTE HOSPITAL SETTING. RECONSULT PRN.

## 2018-08-09 NOTE — H&P ADULT - NSHPLABSRESULTS_GEN_ALL_CORE
12.4   11.39 )-----------( 152      ( 08 Aug 2018 21:44 )             38.0       CBC Full  -  ( 08 Aug 2018 21:44 )  WBC Count : 11.39 K/uL  Hemoglobin : 12.4 g/dL  Hematocrit : 38.0 %  Platelet Count - Automated : 152 K/uL  Mean Cell Volume : 81.9 fl  Mean Cell Hemoglobin : 26.7 pg  Mean Cell Hemoglobin Concentration : 32.6 gm/dL  Auto Neutrophil # : 9.87 K/uL  Auto Lymphocyte # : 0.58 K/uL  Auto Monocyte # : 0.75 K/uL  Auto Eosinophil # : 0.08 K/uL  Auto Basophil # : 0.05 K/uL  Auto Neutrophil % : 86.7 %  Auto Lymphocyte % : 5.1 %  Auto Monocyte % : 6.6 %  Auto Eosinophil % : 0.7 %  Auto Basophil % : 0.4 %          136  |  97  |  22  ----------------------------<  140<H>  4.2   |  32<H>  |  0.77    Ca    8.8      08 Aug 2018 21:44    TPro  7.1  /  Alb  3.1<L>  /  TBili  1.3<H>  /  DBili  x   /  AST  19  /  ALT  12  /  AlkPhos  62        LIVER FUNCTIONS - ( 08 Aug 2018 21:44 )  Alb: 3.1 g/dL / Pro: 7.1 gm/dL / ALK PHOS: 62 U/L / ALT: 12 U/L / AST: 19 U/L / GGT: x             PT/INR - ( 08 Aug 2018 21:44 )   PT: 16.7 sec;   INR: 1.53 ratio         PTT - ( 08 Aug 2018 21:44 )  PTT:33.0 sec    CARDIAC MARKERS ( 09 Aug 2018 04:32 )  0.043 ng/mL / x     / x     / x     / x      CARDIAC MARKERS ( 09 Aug 2018 01:07 )  0.053 ng/mL / x     / x     / x     / x      CARDIAC MARKERS ( 08 Aug 2018 21:44 )  0.043 ng/mL / x     / x     / x     / x            Urinalysis Basic - ( 09 Aug 2018 06:56 )    Color: Yellow / Appearance: very cloudy / S.020 / pH: x  Gluc: x / Ketone: Trace  / Bili: Negative / Urobili: 4 mg/dL   Blood: x / Protein: 100 mg/dL / Nitrite: Negative   Leuk Esterase: Moderate / RBC: 3-5 /HPF / WBC >50   Sq Epi: x / Non Sq Epi: Few / Bacteria: Few            MEDICATIONS  (STANDING):  apixaban 2.5 milliGRAM(s) Oral every 12 hours  aspirin enteric coated 81 milliGRAM(s) Oral daily  cefTRIAXone Injectable. 1000 milliGRAM(s) IV Push once  cefTRIAXone Injectable.      digoxin     Tablet 0.125 milliGRAM(s) Oral daily  docusate sodium 100 milliGRAM(s) Oral three times a day  doxazosin 2 milliGRAM(s) Oral at bedtime  enalapril 2.5 milliGRAM(s) Oral two times a day  escitalopram 10 milliGRAM(s) Oral daily  famotidine    Tablet 20 milliGRAM(s) Oral daily  furosemide    Tablet 60 milliGRAM(s) Oral daily  gabapentin 300 milliGRAM(s) Oral three times a day  metoprolol tartrate 12.5 milliGRAM(s) Oral two times a day

## 2018-08-09 NOTE — SWALLOW BEDSIDE ASSESSMENT ADULT - SLP GENERAL OBSERVATIONS
The patient was frail in appearance. A loss of bulk was noted in strap muscle regions. Hand tremors were noted at rest.  The pt was alert and interactive but communicatively passive/internally distractible at times. He was able to verbalize during communicative probes and in conversation. At these times, his utterances were produced without an overt primary motor speech or primary linguistic pathology. However, his verbalizations were often brief while reflecting variably decreased orientation/STM/insight consistent with baseline Cognitive Dysfunction associated with dementia. Note that pt was able to verbalize his needs at times despite Cognitive Dysfunction and he is likely at or close to communicative baseline.

## 2018-08-09 NOTE — ED PROVIDER NOTE - CONSTITUTIONAL, MLM
normal... Well appearing, well nourished, awake, alert, oriented to self and in no apparent distress.

## 2018-08-09 NOTE — CONSULT NOTE ADULT - SUBJECTIVE AND OBJECTIVE BOX
PCP:    REQUESTING PHYSICIAN:    REASON FOR CONSULT:    CHIEF COMPLAINT:    HPI:  89 y/o male with a PMHx of A. priti on Eliquis w/ PPM, hx of CVA, DM, CAD, CHF and other comorbidities brought from [facility] for c/o chest pain in the lower central chest radiating to b/l arms. Also found to have temp of 102.8 in ER last night.   No active chest pain at this time.   No ABX given in ER. (09 Aug 2018 08:47)  Cardiology evaluation requested. He continues to complain of pain despite morphine IV. Paced rhythm      PAST MEDICAL & SURGICAL HISTORY:  Pacemaker  Afib  Diabetes  MI (myocardial infarction)  BPH (benign prostatic hyperplasia)  Hypertension  ACS (acute coronary syndrome)  CAD (coronary artery disease)      Allergies    No Known Allergies    Intolerances        SOCIAL HISTORY:    FAMILY HISTORY:  No pertinent family history in first degree relatives      MEDICATIONS:  MEDICATIONS  (STANDING):  apixaban 2.5 milliGRAM(s) Oral every 12 hours  aspirin enteric coated 81 milliGRAM(s) Oral daily  cefTRIAXone Injectable.      dextrose 5%. 1000 milliLiter(s) (50 mL/Hr) IV Continuous <Continuous>  dextrose 50% Injectable 12.5 Gram(s) IV Push once  dextrose 50% Injectable 25 Gram(s) IV Push once  dextrose 50% Injectable 25 Gram(s) IV Push once  digoxin     Tablet 0.125 milliGRAM(s) Oral daily  docusate sodium 100 milliGRAM(s) Oral three times a day  doxazosin 2 milliGRAM(s) Oral at bedtime  enalapril 2.5 milliGRAM(s) Oral two times a day  escitalopram 10 milliGRAM(s) Oral daily  famotidine    Tablet 20 milliGRAM(s) Oral daily  furosemide    Tablet 60 milliGRAM(s) Oral daily  gabapentin 300 milliGRAM(s) Oral three times a day  metoprolol tartrate 12.5 milliGRAM(s) Oral two times a day    MEDICATIONS  (PRN):  acetaminophen   Tablet 650 milliGRAM(s) Oral every 6 hours PRN For Temp greater than 38 C (100.4 F)  aluminum hydroxide/magnesium hydroxide/simethicone Suspension 30 milliLiter(s) Oral every 6 hours PRN Dyspepsia  dextrose 40% Gel 15 Gram(s) Oral once PRN Blood Glucose LESS THAN 70 milliGRAM(s)/deciliter  glucagon  Injectable 1 milliGRAM(s) IntraMuscular once PRN Glucose LESS THAN 70 milligrams/deciliter  magnesium hydroxide Suspension 30 milliLiter(s) Oral daily PRN Constipation      REVIEW OF SYSTEMS:    CONSTITUTIONAL: No weakness, fevers or chills  EYES/ENT: No visual changes;  No vertigo or throat pain   NECK: No pain or stiffness  RESPIRATORY: No cough, wheezing, hemoptysis; No shortness of breath  CARDIOVASCULAR: Chest pain  GASTROINTESTINAL: No abdominal or epigastric pain. No nausea, vomiting, or hematemesis; No diarrhea or constipation. No melena or hematochezia.  GENITOURINARY: No dysuria, frequency or hematuria  NEUROLOGICAL: No numbness or weakness  SKIN: No itching, burning, rashes, or lesions   All other review of systems is negative unless indicated above    Vital Signs Last 24 Hrs  T(C): 37.1 (09 Aug 2018 11:50), Max: 39.3 (08 Aug 2018 21:45)  T(F): 98.7 (09 Aug 2018 11:50), Max: 102.8 (08 Aug 2018 21:45)  HR: 73 (09 Aug 2018 12:25) (66 - 82)  BP: 118/49 (09 Aug 2018 12:25) (106/86 - 124/78)  BP(mean): --  RR: 20 (09 Aug 2018 11:50) (18 - 22)  SpO2: 100% (09 Aug 2018 11:50) (95% - 100%)    I&O's Summary      PHYSICAL EXAM:    Constitutional: NAD, awake and alert, well-developed  HEENT: PERR, EOMI,  No oral cyananosis.  Neck:  supple,  No JVD  Respiratory: Breath sounds are clear bilaterally, No wheezing, rales or rhonchi  Cardiovascular: S1 and S2, regular rate and rhythm, no Murmurs, gallops or rubs  Gastrointestinal: Bowel Sounds present, soft, nontender.   Extremities: No peripheral edema. No clubbing or cyanosis.  Vascular: 2+ peripheral pulses  Neurological: A/O x 3, no focal deficits  Musculoskeletal: no calf tenderness.  Skin: No rashes.      LABS: All Labs Reviewed:                        12.1   7.39  )-----------( 144      ( 09 Aug 2018 12:32 )             36.3                         12.4   11.39 )-----------( 152      ( 08 Aug 2018 21:44 )             38.0     09 Aug 2018 12:32    140    |  99     |  21     ----------------------------<  105    3.8     |  32     |  0.65   08 Aug 2018 21:44    136    |  97     |  22     ----------------------------<  140    4.2     |  32     |  0.77     Ca    8.5        09 Aug 2018 12:32  Ca    8.8        08 Aug 2018 21:44    TPro  7.1    /  Alb  3.1    /  TBili  1.3    /  DBili  x      /  AST  19     /  ALT  12     /  AlkPhos  62     08 Aug 2018 21:44    PT/INR - ( 08 Aug 2018 21:44 )   PT: 16.7 sec;   INR: 1.53 ratio         PTT - ( 08 Aug 2018 21:44 )  PTT:33.0 sec  CARDIAC MARKERS ( 09 Aug 2018 04:32 )  0.043 ng/mL / x     / x     / x     / x      CARDIAC MARKERS ( 09 Aug 2018 01:07 )  0.053 ng/mL / x     / x     / x     / x      CARDIAC MARKERS ( 08 Aug 2018 21:44 )  0.043 ng/mL / x     / x     / x     / x          Blood Culture:         RADIOLOGY/EKG:

## 2018-08-09 NOTE — SWALLOW BEDSIDE ASSESSMENT ADULT - NS SPL SWALLOW CLINIC TRIAL FT
Solids and liquids thinner then honey consistency were not offered given his dysphagic profile. The pt was not stimulable for use of therapy maneuvers when probed. He denied odynophagia.

## 2018-08-09 NOTE — SWALLOW BEDSIDE ASSESSMENT ADULT - SWALLOW EVAL: RECOMMENDED DIET
SUGGEST A DYSPHAGIA 1 DIET WITH HONEY THICK LIQUIDS AS TOLERATED. THIS IS CURRENTLY THE LEAST RESTRICTIVE TOLERABLE DIET CONSISTENCIES FROM AN OROPHARYNGEAL SWALLOWING STANCE AT THIS TIME. DIET TEXTURE UPGRADE BEYOND THE LATTER DIET IS NOT FORESEEABLE AT THIS TIME.

## 2018-08-09 NOTE — SWALLOW BEDSIDE ASSESSMENT ADULT - SWALLOW EVAL: RECOMMENDED FEEDING/EATING TECHNIQUES
maintain upright posture during/after eating for 30 mins/check mouth frequently for oral residue/pocketing/crush medication (when feasible)

## 2018-08-09 NOTE — SWALLOW BEDSIDE ASSESSMENT ADULT - DIET PRIOR TO ADMI
The patient was reportedly on a puree texture diet with honey thick liquids at [facility].

## 2018-08-09 NOTE — ED ADULT NURSE REASSESSMENT NOTE - NS ED NURSE REASSESS COMMENT FT1
Patient care received from RN Kelly Mitchell. Patient A&Ox2, disoriented to time and situation. Denies pain/discomfort, no s/s of distress present at this time. Pt repositioned for comfort and safety, wait time explained. Hospitalist consult pending. Safety & comfort measures in place, will continue to monitor.
Patient has not voided on my time, 170 mL per bladder scanner. Pt reporting sharp chest pain, VSS. MD Westfall notified, new orders received. Morphine administered as prescribed, EKG ordered. Pt failed dysphagia screen, NPO pending S/S consult per MD Westfall. HOB elevated, pt repositioned for comfort and safety. Will continue to monitor.
Patient's reports relief s/p morphine as prescribed. Resting comfortably in bed, MD Aceves bedside. S/S consult pending. Hand-off report to [facility], awaiting transport to . Safety & comfort measures in place, hourly rounding on my time.
Pt remains as previously assessed, rectal temp of 100.1, MD Jack aware, new orders received and carried out, UA/UC obtained via straight cath due to patient incontinence, MD Jack aware, pt with no stated concerns at this time, comfort and safety measures maintained, will continue to monitor.
Received patient from [facility] [place]. Pt asleep but easily arousable. VS stable at present, pt with no stated concerns, AxOx2-3, forgetful at times, MD Jack made aware of 2nd troponin, per MD obtain 3rd troponin in 3 hours and notify of results, comfort and safety measures maintained. Will continue to monitor.
pt resting comfortably in bed with no acute distress noted. awaiting for hospitalist. Will cont to monitor for safety and comfort.
Report taken at the change of shift at bedside. Pt awake alert and oriented x4 resting comfortably in bed with no acute distress noted. Vitals stable. call bell within reach. Plan of care updated to pt and family at bedside. Denies cp,sob,ha,dz,n/v/d/fever/chills or urinary sx. Will cont to monitor for safety and comfort.

## 2018-08-10 LAB
ANION GAP SERPL CALC-SCNC: 10 MMOL/L — SIGNIFICANT CHANGE UP (ref 5–17)
BUN SERPL-MCNC: 24 MG/DL — HIGH (ref 7–23)
CALCIUM SERPL-MCNC: 8.9 MG/DL — SIGNIFICANT CHANGE UP (ref 8.5–10.1)
CHLORIDE SERPL-SCNC: 100 MMOL/L — SIGNIFICANT CHANGE UP (ref 96–108)
CO2 SERPL-SCNC: 30 MMOL/L — SIGNIFICANT CHANGE UP (ref 22–31)
CREAT SERPL-MCNC: 0.54 MG/DL — SIGNIFICANT CHANGE UP (ref 0.5–1.3)
GLUCOSE BLDC GLUCOMTR-MCNC: 105 MG/DL — HIGH (ref 70–99)
GLUCOSE BLDC GLUCOMTR-MCNC: 106 MG/DL — HIGH (ref 70–99)
GLUCOSE BLDC GLUCOMTR-MCNC: 110 MG/DL — HIGH (ref 70–99)
GLUCOSE BLDC GLUCOMTR-MCNC: 121 MG/DL — HIGH (ref 70–99)
GLUCOSE SERPL-MCNC: 94 MG/DL — SIGNIFICANT CHANGE UP (ref 70–99)
HBA1C BLD-MCNC: 5.7 % — HIGH (ref 4–5.6)
HCT VFR BLD CALC: 38.3 % — LOW (ref 39–50)
HGB BLD-MCNC: 12.2 G/DL — LOW (ref 13–17)
MCHC RBC-ENTMCNC: 26.9 PG — LOW (ref 27–34)
MCHC RBC-ENTMCNC: 31.9 GM/DL — LOW (ref 32–36)
MCV RBC AUTO: 84.4 FL — SIGNIFICANT CHANGE UP (ref 80–100)
NRBC # BLD: 0 /100 WBCS — SIGNIFICANT CHANGE UP (ref 0–0)
PLATELET # BLD AUTO: 145 K/UL — LOW (ref 150–400)
POTASSIUM SERPL-MCNC: 3.9 MMOL/L — SIGNIFICANT CHANGE UP (ref 3.5–5.3)
POTASSIUM SERPL-SCNC: 3.9 MMOL/L — SIGNIFICANT CHANGE UP (ref 3.5–5.3)
RBC # BLD: 4.54 M/UL — SIGNIFICANT CHANGE UP (ref 4.2–5.8)
RBC # FLD: 17.7 % — HIGH (ref 10.3–14.5)
SODIUM SERPL-SCNC: 140 MMOL/L — SIGNIFICANT CHANGE UP (ref 135–145)
WBC # BLD: 6.55 K/UL — SIGNIFICANT CHANGE UP (ref 3.8–10.5)
WBC # FLD AUTO: 6.55 K/UL — SIGNIFICANT CHANGE UP (ref 3.8–10.5)

## 2018-08-10 PROCEDURE — 99233 SBSQ HOSP IP/OBS HIGH 50: CPT

## 2018-08-10 RX ORDER — MULTIVIT-MIN/FERROUS GLUCONATE 9 MG/15 ML
1 LIQUID (ML) ORAL DAILY
Qty: 0 | Refills: 0 | Status: DISCONTINUED | OUTPATIENT
Start: 2018-08-10 | End: 2018-08-14

## 2018-08-10 RX ORDER — LACTOBACILLUS ACIDOPHILUS 100MM CELL
1 CAPSULE ORAL DAILY
Qty: 0 | Refills: 0 | Status: DISCONTINUED | OUTPATIENT
Start: 2018-08-10 | End: 2018-08-14

## 2018-08-10 RX ADMIN — Medication 2 MILLIGRAM(S): at 21:55

## 2018-08-10 RX ADMIN — ESCITALOPRAM OXALATE 10 MILLIGRAM(S): 10 TABLET, FILM COATED ORAL at 11:06

## 2018-08-10 RX ADMIN — Medication 60 MILLIGRAM(S): at 09:29

## 2018-08-10 RX ADMIN — Medication 1 TABLET(S): at 11:06

## 2018-08-10 RX ADMIN — Medication 100 MILLIGRAM(S): at 21:55

## 2018-08-10 RX ADMIN — APIXABAN 2.5 MILLIGRAM(S): 2.5 TABLET, FILM COATED ORAL at 05:42

## 2018-08-10 RX ADMIN — GABAPENTIN 300 MILLIGRAM(S): 400 CAPSULE ORAL at 11:06

## 2018-08-10 RX ADMIN — Medication 1 TABLET(S): at 17:44

## 2018-08-10 RX ADMIN — GABAPENTIN 300 MILLIGRAM(S): 400 CAPSULE ORAL at 05:42

## 2018-08-10 RX ADMIN — Medication 81 MILLIGRAM(S): at 11:06

## 2018-08-10 RX ADMIN — APIXABAN 2.5 MILLIGRAM(S): 2.5 TABLET, FILM COATED ORAL at 17:44

## 2018-08-10 RX ADMIN — Medication 650 MILLIGRAM(S): at 11:06

## 2018-08-10 RX ADMIN — CEFTRIAXONE 1000 MILLIGRAM(S): 500 INJECTION, POWDER, FOR SOLUTION INTRAMUSCULAR; INTRAVENOUS at 09:29

## 2018-08-10 RX ADMIN — Medication 0.12 MILLIGRAM(S): at 05:42

## 2018-08-10 RX ADMIN — GABAPENTIN 300 MILLIGRAM(S): 400 CAPSULE ORAL at 21:55

## 2018-08-10 RX ADMIN — Medication 100 MILLIGRAM(S): at 11:06

## 2018-08-10 RX ADMIN — FAMOTIDINE 20 MILLIGRAM(S): 10 INJECTION INTRAVENOUS at 11:06

## 2018-08-10 NOTE — CHART NOTE - NSCHARTNOTEFT_GEN_A_CORE
Received call from RN that is BP -94/63 and due to get his lasix advised patient to hold medication for now and check Bp again and follow up with primary .

## 2018-08-10 NOTE — PHYSICAL THERAPY INITIAL EVALUATION ADULT - LIVES WITH, PROFILE
spouse/in a house with 3STE with rail, currently came from [facility]. States wife is currently in hospital as well for anxiety

## 2018-08-10 NOTE — PHYSICAL THERAPY INITIAL EVALUATION ADULT - PERTINENT HX OF CURRENT PROBLEM, REHAB EVAL
88yoM brought in from [facility] for c/o chest pain radiating to B arms. Pt with pacemaker, interrogation revealed no events.

## 2018-08-10 NOTE — CDI QUERY NOTE - NSCDIOTHERTXTBX_GEN_ALL_CORE_HH
Patient admitted with Chest pain- likely secondary to GERD documented.  Found to Have UTI ( on IVAB)    CHF has been documented  8/9 echo reveals pleural effusion and EF- 20-25%  On lasix 60 mg po qday ( same at home )    Please clarify the type and the acuity of the CHF ?  a) Chronic ? Acute on chronic ? Other ?  b) Systolic CHF ? Diastolic CHF ? Other ?  c) Unable to determine ?  d) Other ? Please clarify

## 2018-08-10 NOTE — PHYSICAL THERAPY INITIAL EVALUATION ADULT - CRITERIA FOR SKILLED THERAPEUTIC INTERVENTIONS
rehab potential/anticipated equipment needs at discharge/functional limitations in following categories/anticipated discharge recommendation/risk reduction/prevention/predicted duration of therapy intervention/impairments found

## 2018-08-10 NOTE — PHYSICAL THERAPY INITIAL EVALUATION ADULT - ACTIVE RANGE OF MOTION EXAMINATION, REHAB EVAL
bilateral  lower extremity Active ROM was WFL (within functional limits)/Limited R shld flex to 90 deg and L knee flex to 90 deg, pt states he fell 3 years ago and has pain/bilateral upper extremity Active ROM was WFL (within functional limits)

## 2018-08-11 DIAGNOSIS — I50.22 CHRONIC SYSTOLIC (CONGESTIVE) HEART FAILURE: ICD-10-CM

## 2018-08-11 LAB
-  AMPICILLIN: SIGNIFICANT CHANGE UP
-  CIPROFLOXACIN: SIGNIFICANT CHANGE UP
-  LEVOFLOXACIN: SIGNIFICANT CHANGE UP
-  NITROFURANTOIN: SIGNIFICANT CHANGE UP
-  TETRACYCLINE: SIGNIFICANT CHANGE UP
-  VANCOMYCIN: SIGNIFICANT CHANGE UP
CULTURE RESULTS: SIGNIFICANT CHANGE UP
GLUCOSE BLDC GLUCOMTR-MCNC: 107 MG/DL — HIGH (ref 70–99)
GLUCOSE BLDC GLUCOMTR-MCNC: 116 MG/DL — HIGH (ref 70–99)
GLUCOSE BLDC GLUCOMTR-MCNC: 128 MG/DL — HIGH (ref 70–99)
GLUCOSE BLDC GLUCOMTR-MCNC: 138 MG/DL — HIGH (ref 70–99)
METHOD TYPE: SIGNIFICANT CHANGE UP
ORGANISM # SPEC MICROSCOPIC CNT: SIGNIFICANT CHANGE UP
ORGANISM # SPEC MICROSCOPIC CNT: SIGNIFICANT CHANGE UP
SPECIMEN SOURCE: SIGNIFICANT CHANGE UP

## 2018-08-11 PROCEDURE — 99233 SBSQ HOSP IP/OBS HIGH 50: CPT

## 2018-08-11 RX ORDER — METOPROLOL TARTRATE 50 MG
25 TABLET ORAL DAILY
Qty: 0 | Refills: 0 | Status: DISCONTINUED | OUTPATIENT
Start: 2018-08-11 | End: 2018-08-14

## 2018-08-11 RX ORDER — RANOLAZINE 500 MG/1
500 TABLET, FILM COATED, EXTENDED RELEASE ORAL
Qty: 0 | Refills: 0 | Status: DISCONTINUED | OUTPATIENT
Start: 2018-08-11 | End: 2018-08-14

## 2018-08-11 RX ADMIN — APIXABAN 2.5 MILLIGRAM(S): 2.5 TABLET, FILM COATED ORAL at 17:32

## 2018-08-11 RX ADMIN — Medication 2.5 MILLIGRAM(S): at 06:06

## 2018-08-11 RX ADMIN — RANOLAZINE 500 MILLIGRAM(S): 500 TABLET, FILM COATED, EXTENDED RELEASE ORAL at 20:55

## 2018-08-11 RX ADMIN — Medication 100 MILLIGRAM(S): at 13:58

## 2018-08-11 RX ADMIN — CEFTRIAXONE 1000 MILLIGRAM(S): 500 INJECTION, POWDER, FOR SOLUTION INTRAMUSCULAR; INTRAVENOUS at 10:35

## 2018-08-11 RX ADMIN — Medication 1 TABLET(S): at 11:22

## 2018-08-11 RX ADMIN — Medication 100 MILLIGRAM(S): at 20:56

## 2018-08-11 RX ADMIN — Medication 60 MILLIGRAM(S): at 06:05

## 2018-08-11 RX ADMIN — Medication 0.12 MILLIGRAM(S): at 06:06

## 2018-08-11 RX ADMIN — GABAPENTIN 300 MILLIGRAM(S): 400 CAPSULE ORAL at 13:57

## 2018-08-11 RX ADMIN — FAMOTIDINE 20 MILLIGRAM(S): 10 INJECTION INTRAVENOUS at 11:22

## 2018-08-11 RX ADMIN — Medication 2 MILLIGRAM(S): at 20:55

## 2018-08-11 RX ADMIN — Medication 81 MILLIGRAM(S): at 11:22

## 2018-08-11 RX ADMIN — ESCITALOPRAM OXALATE 10 MILLIGRAM(S): 10 TABLET, FILM COATED ORAL at 11:22

## 2018-08-11 RX ADMIN — GABAPENTIN 300 MILLIGRAM(S): 400 CAPSULE ORAL at 06:06

## 2018-08-11 RX ADMIN — Medication 100 MILLIGRAM(S): at 06:06

## 2018-08-11 RX ADMIN — APIXABAN 2.5 MILLIGRAM(S): 2.5 TABLET, FILM COATED ORAL at 06:06

## 2018-08-11 RX ADMIN — GABAPENTIN 300 MILLIGRAM(S): 400 CAPSULE ORAL at 20:55

## 2018-08-11 RX ADMIN — Medication 2.5 MILLIGRAM(S): at 17:32

## 2018-08-12 LAB
ANION GAP SERPL CALC-SCNC: 6 MMOL/L — SIGNIFICANT CHANGE UP (ref 5–17)
BUN SERPL-MCNC: 17 MG/DL — SIGNIFICANT CHANGE UP (ref 7–23)
CALCIUM SERPL-MCNC: 8.5 MG/DL — SIGNIFICANT CHANGE UP (ref 8.5–10.1)
CHLORIDE SERPL-SCNC: 98 MMOL/L — SIGNIFICANT CHANGE UP (ref 96–108)
CO2 SERPL-SCNC: 34 MMOL/L — HIGH (ref 22–31)
CREAT SERPL-MCNC: 0.61 MG/DL — SIGNIFICANT CHANGE UP (ref 0.5–1.3)
GLUCOSE BLDC GLUCOMTR-MCNC: 108 MG/DL — HIGH (ref 70–99)
GLUCOSE BLDC GLUCOMTR-MCNC: 115 MG/DL — HIGH (ref 70–99)
GLUCOSE BLDC GLUCOMTR-MCNC: 115 MG/DL — HIGH (ref 70–99)
GLUCOSE BLDC GLUCOMTR-MCNC: 122 MG/DL — HIGH (ref 70–99)
GLUCOSE SERPL-MCNC: 124 MG/DL — HIGH (ref 70–99)
HCT VFR BLD CALC: 38.1 % — LOW (ref 39–50)
HGB BLD-MCNC: 12.2 G/DL — LOW (ref 13–17)
LIDOCAIN IGE QN: 54 U/L — LOW (ref 73–393)
MAGNESIUM SERPL-MCNC: 2 MG/DL — SIGNIFICANT CHANGE UP (ref 1.6–2.6)
MCHC RBC-ENTMCNC: 26.9 PG — LOW (ref 27–34)
MCHC RBC-ENTMCNC: 32 GM/DL — SIGNIFICANT CHANGE UP (ref 32–36)
MCV RBC AUTO: 84.1 FL — SIGNIFICANT CHANGE UP (ref 80–100)
NRBC # BLD: 0 /100 WBCS — SIGNIFICANT CHANGE UP (ref 0–0)
PHOSPHATE SERPL-MCNC: 3.2 MG/DL — SIGNIFICANT CHANGE UP (ref 2.5–4.5)
PLATELET # BLD AUTO: 154 K/UL — SIGNIFICANT CHANGE UP (ref 150–400)
POTASSIUM SERPL-MCNC: 4.2 MMOL/L — SIGNIFICANT CHANGE UP (ref 3.5–5.3)
POTASSIUM SERPL-SCNC: 4.2 MMOL/L — SIGNIFICANT CHANGE UP (ref 3.5–5.3)
RBC # BLD: 4.53 M/UL — SIGNIFICANT CHANGE UP (ref 4.2–5.8)
RBC # FLD: 17.6 % — HIGH (ref 10.3–14.5)
SODIUM SERPL-SCNC: 138 MMOL/L — SIGNIFICANT CHANGE UP (ref 135–145)
TROPONIN I SERPL-MCNC: 0.03 NG/ML — SIGNIFICANT CHANGE UP (ref 0.01–0.04)
WBC # BLD: 6.62 K/UL — SIGNIFICANT CHANGE UP (ref 3.8–10.5)
WBC # FLD AUTO: 6.62 K/UL — SIGNIFICANT CHANGE UP (ref 3.8–10.5)

## 2018-08-12 PROCEDURE — 74018 RADEX ABDOMEN 1 VIEW: CPT | Mod: 26

## 2018-08-12 PROCEDURE — 93010 ELECTROCARDIOGRAM REPORT: CPT

## 2018-08-12 PROCEDURE — 99233 SBSQ HOSP IP/OBS HIGH 50: CPT

## 2018-08-12 RX ORDER — POLYETHYLENE GLYCOL 3350 17 G/17G
17 POWDER, FOR SOLUTION ORAL DAILY
Qty: 0 | Refills: 0 | Status: DISCONTINUED | OUTPATIENT
Start: 2018-08-12 | End: 2018-08-14

## 2018-08-12 RX ORDER — MORPHINE SULFATE 50 MG/1
2 CAPSULE, EXTENDED RELEASE ORAL ONCE
Qty: 0 | Refills: 0 | Status: DISCONTINUED | OUTPATIENT
Start: 2018-08-12 | End: 2018-08-12

## 2018-08-12 RX ORDER — CIPROFLOXACIN LACTATE 400MG/40ML
400 VIAL (ML) INTRAVENOUS EVERY 12 HOURS
Qty: 0 | Refills: 0 | Status: DISCONTINUED | OUTPATIENT
Start: 2018-08-12 | End: 2018-08-13

## 2018-08-12 RX ORDER — SENNA PLUS 8.6 MG/1
2 TABLET ORAL AT BEDTIME
Qty: 0 | Refills: 0 | Status: DISCONTINUED | OUTPATIENT
Start: 2018-08-12 | End: 2018-08-14

## 2018-08-12 RX ADMIN — Medication 2.5 MILLIGRAM(S): at 17:11

## 2018-08-12 RX ADMIN — Medication 100 MILLIGRAM(S): at 14:59

## 2018-08-12 RX ADMIN — Medication 0.12 MILLIGRAM(S): at 06:35

## 2018-08-12 RX ADMIN — Medication 100 MILLIGRAM(S): at 06:35

## 2018-08-12 RX ADMIN — Medication 1 TABLET(S): at 11:26

## 2018-08-12 RX ADMIN — GABAPENTIN 300 MILLIGRAM(S): 400 CAPSULE ORAL at 22:50

## 2018-08-12 RX ADMIN — CEFTRIAXONE 1000 MILLIGRAM(S): 500 INJECTION, POWDER, FOR SOLUTION INTRAMUSCULAR; INTRAVENOUS at 10:10

## 2018-08-12 RX ADMIN — APIXABAN 2.5 MILLIGRAM(S): 2.5 TABLET, FILM COATED ORAL at 17:21

## 2018-08-12 RX ADMIN — Medication 81 MILLIGRAM(S): at 11:26

## 2018-08-12 RX ADMIN — Medication 200 MILLIGRAM(S): at 12:23

## 2018-08-12 RX ADMIN — GABAPENTIN 300 MILLIGRAM(S): 400 CAPSULE ORAL at 14:59

## 2018-08-12 RX ADMIN — Medication 200 MILLIGRAM(S): at 17:09

## 2018-08-12 RX ADMIN — Medication 2.5 MILLIGRAM(S): at 06:35

## 2018-08-12 RX ADMIN — ESCITALOPRAM OXALATE 10 MILLIGRAM(S): 10 TABLET, FILM COATED ORAL at 11:26

## 2018-08-12 RX ADMIN — Medication 60 MILLIGRAM(S): at 06:35

## 2018-08-12 RX ADMIN — GABAPENTIN 300 MILLIGRAM(S): 400 CAPSULE ORAL at 06:35

## 2018-08-12 RX ADMIN — Medication 2 MILLIGRAM(S): at 22:50

## 2018-08-12 RX ADMIN — FAMOTIDINE 20 MILLIGRAM(S): 10 INJECTION INTRAVENOUS at 11:26

## 2018-08-12 RX ADMIN — SENNA PLUS 2 TABLET(S): 8.6 TABLET ORAL at 22:50

## 2018-08-12 RX ADMIN — RANOLAZINE 500 MILLIGRAM(S): 500 TABLET, FILM COATED, EXTENDED RELEASE ORAL at 06:35

## 2018-08-12 RX ADMIN — POLYETHYLENE GLYCOL 3350 17 GRAM(S): 17 POWDER, FOR SOLUTION ORAL at 22:50

## 2018-08-12 RX ADMIN — MORPHINE SULFATE 2 MILLIGRAM(S): 50 CAPSULE, EXTENDED RELEASE ORAL at 19:53

## 2018-08-12 RX ADMIN — APIXABAN 2.5 MILLIGRAM(S): 2.5 TABLET, FILM COATED ORAL at 06:34

## 2018-08-12 RX ADMIN — Medication 100 MILLIGRAM(S): at 22:50

## 2018-08-12 RX ADMIN — MORPHINE SULFATE 2 MILLIGRAM(S): 50 CAPSULE, EXTENDED RELEASE ORAL at 20:05

## 2018-08-12 RX ADMIN — Medication 25 MILLIGRAM(S): at 06:35

## 2018-08-12 RX ADMIN — RANOLAZINE 500 MILLIGRAM(S): 500 TABLET, FILM COATED, EXTENDED RELEASE ORAL at 17:20

## 2018-08-12 NOTE — CHART NOTE - NSCHARTNOTEFT_GEN_A_CORE
Notified by nurse of patient complaining of CP, substernal radiating to the R shoulder, currently 4/10 and worse with movement. States that the pain has been present all day.       VS:   111/57, HR=70, RR=18 Sa=99 ON o2    PHYSICAL EXAM:     General- well appearing, NAD   CVS- normal s1, s2, rrr, no m/r/g  Respiratory- No wheezing, crackles or rhonci on anterior lung exam   Abdomen- soft, non distended, diffuse abdominal tenderness, no rigidity, + BS  Extremities- pulses 2+ b/l       A/P: 89 yo M A fib, on Eliquis w/ PPM, hx of CVA, DM, CAD, CHF (EF=)  Problem List:  CP  -Trop x1  -EKG STAT  -CBC, BMP, Mg, Phos  -morphine 2 mg   -continue monitoring VS    Abdominal pain  -Abdominal XR  -Senna, miralax          Discussed with Dr. Blanco PGY-3,and RN Notified by nurse of patient complaining of CP (chest pain)      Patient seen and evaluated bedside. CP noted to be substernal radiating to the R shoulder, currently 4/10 and worse with movement. States that the pain has been present all day.       VS:   111/57, HR=70, RR=18 Sa=99 ON o2    PHYSICAL EXAM:     General- well appearing, NAD   CVS- normal s1, s2, rrr, no m/r/g  Respiratory- No wheezing, crackles or rhonci on anterior lung exam   Abdomen- soft, non distended, diffuse abdominal tenderness, no rigidity, + BS  Extremities- pulses 2+ b/l       A/P: 89 yo M A fib, on Eliquis w/ PPM, hx of CVA, DM, CAD, CHF (EF=)  Problem List:    CP  -Trop x1  -EKG STAT  -CBC, BMP, Mg, Phos  -morphine 2 mg   -continue monitoring VS    Abdominal pain  -Abdominal XR  -Senna, miralax          Discussed with Dr. Blanco PGY-3,and RN Notified by nurse of patient complaining of CP (chest pain)      Patient seen and evaluated bedside. CP noted to be substernal radiating to the R shoulder, currently 4/10 and worse with movement. States that the pain has been present all day.       VS:   111/57, HR=70, RR=18 Sa=99 ON o2    PHYSICAL EXAM:     General- well appearing, NAD   CVS- normal s1, s2, rrr, no m/r/g  Respiratory- No wheezing, crackles or rhonci on anterior lung exam   Abdomen- soft, non distended, diffuse abdominal tenderness, no rigidity, + BS  Extremities- pulses 2+ b/l       A/P: 87 yo M A fib, on Eliquis w/ PPM, hx of CVA, DM, CAD, CHF (EF=)  Problem List:    CP  -Trop x1  -EKG STAT  -CBC, BMP, Mg, Phos, Lipase  -morphine 2 mg   -continue monitoring VS      Abdominal pain  -Abdominal XR  -Senna, miralax          Discussed with Dr. Duke, Dr. Blanco PGY-3,and RN

## 2018-08-13 LAB
ANION GAP SERPL CALC-SCNC: 7 MMOL/L — SIGNIFICANT CHANGE UP (ref 5–17)
BUN SERPL-MCNC: 18 MG/DL — SIGNIFICANT CHANGE UP (ref 7–23)
CALCIUM SERPL-MCNC: 8.2 MG/DL — LOW (ref 8.5–10.1)
CHLORIDE SERPL-SCNC: 98 MMOL/L — SIGNIFICANT CHANGE UP (ref 96–108)
CO2 SERPL-SCNC: 34 MMOL/L — HIGH (ref 22–31)
CREAT SERPL-MCNC: 0.58 MG/DL — SIGNIFICANT CHANGE UP (ref 0.5–1.3)
GLUCOSE BLDC GLUCOMTR-MCNC: 112 MG/DL — HIGH (ref 70–99)
GLUCOSE BLDC GLUCOMTR-MCNC: 113 MG/DL — HIGH (ref 70–99)
GLUCOSE BLDC GLUCOMTR-MCNC: 151 MG/DL — HIGH (ref 70–99)
GLUCOSE BLDC GLUCOMTR-MCNC: 168 MG/DL — HIGH (ref 70–99)
GLUCOSE SERPL-MCNC: 109 MG/DL — HIGH (ref 70–99)
HCT VFR BLD CALC: 37.2 % — LOW (ref 39–50)
HGB BLD-MCNC: 11.9 G/DL — LOW (ref 13–17)
MCHC RBC-ENTMCNC: 27 PG — SIGNIFICANT CHANGE UP (ref 27–34)
MCHC RBC-ENTMCNC: 32 GM/DL — SIGNIFICANT CHANGE UP (ref 32–36)
MCV RBC AUTO: 84.4 FL — SIGNIFICANT CHANGE UP (ref 80–100)
NRBC # BLD: 0 /100 WBCS — SIGNIFICANT CHANGE UP (ref 0–0)
PLATELET # BLD AUTO: 148 K/UL — LOW (ref 150–400)
POTASSIUM SERPL-MCNC: 4 MMOL/L — SIGNIFICANT CHANGE UP (ref 3.5–5.3)
POTASSIUM SERPL-SCNC: 4 MMOL/L — SIGNIFICANT CHANGE UP (ref 3.5–5.3)
RBC # BLD: 4.41 M/UL — SIGNIFICANT CHANGE UP (ref 4.2–5.8)
RBC # FLD: 17.3 % — HIGH (ref 10.3–14.5)
SODIUM SERPL-SCNC: 139 MMOL/L — SIGNIFICANT CHANGE UP (ref 135–145)
TROPONIN I SERPL-MCNC: 0.03 NG/ML — SIGNIFICANT CHANGE UP (ref 0.01–0.04)
TROPONIN I SERPL-MCNC: 0.03 NG/ML — SIGNIFICANT CHANGE UP (ref 0.01–0.04)
WBC # BLD: 6.82 K/UL — SIGNIFICANT CHANGE UP (ref 3.8–10.5)
WBC # FLD AUTO: 6.82 K/UL — SIGNIFICANT CHANGE UP (ref 3.8–10.5)

## 2018-08-13 PROCEDURE — 99233 SBSQ HOSP IP/OBS HIGH 50: CPT

## 2018-08-13 RX ADMIN — Medication 200 MILLIGRAM(S): at 06:32

## 2018-08-13 RX ADMIN — GABAPENTIN 300 MILLIGRAM(S): 400 CAPSULE ORAL at 21:41

## 2018-08-13 RX ADMIN — Medication 25 MILLIGRAM(S): at 06:31

## 2018-08-13 RX ADMIN — POLYETHYLENE GLYCOL 3350 17 GRAM(S): 17 POWDER, FOR SOLUTION ORAL at 12:02

## 2018-08-13 RX ADMIN — Medication 1 TABLET(S): at 13:19

## 2018-08-13 RX ADMIN — GABAPENTIN 300 MILLIGRAM(S): 400 CAPSULE ORAL at 13:20

## 2018-08-13 RX ADMIN — Medication 100 MILLIGRAM(S): at 13:20

## 2018-08-13 RX ADMIN — RANOLAZINE 500 MILLIGRAM(S): 500 TABLET, FILM COATED, EXTENDED RELEASE ORAL at 17:57

## 2018-08-13 RX ADMIN — APIXABAN 2.5 MILLIGRAM(S): 2.5 TABLET, FILM COATED ORAL at 06:31

## 2018-08-13 RX ADMIN — Medication 81 MILLIGRAM(S): at 12:02

## 2018-08-13 RX ADMIN — SENNA PLUS 2 TABLET(S): 8.6 TABLET ORAL at 21:41

## 2018-08-13 RX ADMIN — FAMOTIDINE 20 MILLIGRAM(S): 10 INJECTION INTRAVENOUS at 12:02

## 2018-08-13 RX ADMIN — Medication 100 MILLIGRAM(S): at 21:41

## 2018-08-13 RX ADMIN — Medication 1 TABLET(S): at 12:02

## 2018-08-13 RX ADMIN — Medication 2: at 17:56

## 2018-08-13 RX ADMIN — GABAPENTIN 300 MILLIGRAM(S): 400 CAPSULE ORAL at 06:31

## 2018-08-13 RX ADMIN — Medication 2.5 MILLIGRAM(S): at 17:57

## 2018-08-13 RX ADMIN — APIXABAN 2.5 MILLIGRAM(S): 2.5 TABLET, FILM COATED ORAL at 17:57

## 2018-08-13 RX ADMIN — Medication 0.12 MILLIGRAM(S): at 06:31

## 2018-08-13 RX ADMIN — Medication 2 MILLIGRAM(S): at 21:41

## 2018-08-13 RX ADMIN — Medication 60 MILLIGRAM(S): at 06:31

## 2018-08-13 RX ADMIN — Medication 100 MILLIGRAM(S): at 06:31

## 2018-08-13 RX ADMIN — Medication 1 TABLET(S): at 17:59

## 2018-08-13 RX ADMIN — RANOLAZINE 500 MILLIGRAM(S): 500 TABLET, FILM COATED, EXTENDED RELEASE ORAL at 06:31

## 2018-08-13 RX ADMIN — ESCITALOPRAM OXALATE 10 MILLIGRAM(S): 10 TABLET, FILM COATED ORAL at 12:02

## 2018-08-13 NOTE — CONSULT NOTE ADULT - SUBJECTIVE AND OBJECTIVE BOX
Patient is a 88y old  Male who presents with a chief complaint of chest pain (09 Aug 2018 08:47)    HPI:  89 y/o male with h/o A.fib on Eliquis w/ PPM, old CVA, DM, CAD, CHF was admitted on  from [facility] for chest pain in the lower central chest radiating to b/l arms. Also found to have temp of 102.8 in ER. He was noted with pyuria and given ceftriaxone IV. Urine culture was reported with EN spp and bx changed to cipro IV.    PMH: as above  PSH: as above  Meds: per reconciliation sheet, noted below  MEDICATIONS  (STANDING):  amoxicillin  875 milliGRAM(s)/clavulanate 1 Tablet(s) Oral every 12 hours  apixaban 2.5 milliGRAM(s) Oral every 12 hours  aspirin enteric coated 81 milliGRAM(s) Oral daily  dextrose 5%. 1000 milliLiter(s) (50 mL/Hr) IV Continuous <Continuous>  dextrose 50% Injectable 12.5 Gram(s) IV Push once  dextrose 50% Injectable 25 Gram(s) IV Push once  dextrose 50% Injectable 25 Gram(s) IV Push once  digoxin     Tablet 0.125 milliGRAM(s) Oral daily  docusate sodium 100 milliGRAM(s) Oral three times a day  doxazosin 2 milliGRAM(s) Oral at bedtime  enalapril 2.5 milliGRAM(s) Oral two times a day  escitalopram 10 milliGRAM(s) Oral daily  famotidine    Tablet 20 milliGRAM(s) Oral daily  furosemide    Tablet 60 milliGRAM(s) Oral daily  gabapentin 300 milliGRAM(s) Oral three times a day  insulin lispro (HumaLOG) corrective regimen sliding scale   SubCutaneous three times a day before meals  insulin lispro (HumaLOG) corrective regimen sliding scale   SubCutaneous at bedtime  lactobacillus acidophilus 1 Tablet(s) Oral daily  metoprolol succinate ER 25 milliGRAM(s) Oral daily  multivitamin/minerals 1 Tablet(s) Oral daily  polyethylene glycol 3350 17 Gram(s) Oral daily  ranolazine 500 milliGRAM(s) Oral two times a day  senna 2 Tablet(s) Oral at bedtime    MEDICATIONS  (PRN):  acetaminophen   Tablet 650 milliGRAM(s) Oral every 6 hours PRN For Temp greater than 38 C (100.4 F)  aluminum hydroxide/magnesium hydroxide/simethicone Suspension 30 milliLiter(s) Oral every 6 hours PRN Dyspepsia  dextrose 40% Gel 15 Gram(s) Oral once PRN Blood Glucose LESS THAN 70 milliGRAM(s)/deciliter  glucagon  Injectable 1 milliGRAM(s) IntraMuscular once PRN Glucose LESS THAN 70 milligrams/deciliter  guaiFENesin    Syrup 200 milliGRAM(s) Oral every 6 hours PRN Cough  magnesium hydroxide Suspension 30 milliLiter(s) Oral daily PRN Constipation    Allergies    No Known Allergies    Intolerances      Social: no smoking, no alcohol, no illegal drugs; no recent travel, no exposure to TB  FAMILY HISTORY:  No pertinent family history in first degree relatives    no history of premature cardiovascular disease in first degree relatives    ROS: the patient denies fever, no chills, no HA, no seizures, no dizziness, no sore throat, no nasal congestion, no blurry vision, no CP, no palpitations, no SOB, no cough, no abdominal pain, no diarrhea, no N/V, has dysuria, no leg pain, no claudication, no rash, no joint aches, no rectal pain or bleeding, no night sweats  All other systems reviewed and are negative    Vital Signs Last 24 Hrs  T(C): 36.2 (13 Aug 2018 09:58), Max: 36.6 (12 Aug 2018 20:52)  T(F): 97.2 (13 Aug 2018 09:58), Max: 97.8 (12 Aug 2018 20:52)  HR: 70 (13 Aug 2018 09:58) (70 - 70)  BP: 107/53 (13 Aug 2018 09:58) (101/45 - 111/57)  BP(mean): --  RR: 18 (13 Aug 2018 09:58) (16 - 18)  SpO2: 99% (13 Aug 2018 09:58) (99% - 100%)  Daily     Daily Weight in k.6 (13 Aug 2018 10:06)    PE:    Constitutional: frail looking  HEENT: NC/AT, EOMI, PERRLA, conjunctivae clear; ears and nose atraumatic; pharynx benign  Neck: supple; thyroid not palpable  Back: no tenderness  Respiratory: respiratory effort normal; clear to auscultation  Cardiovascular: S1S2 regular, no murmurs  Abdomen: soft, not tender, not distended, positive BS; no liver or spleen organomegaly  Genitourinary: no suprapubic tenderness  Lymphatic: no LN palpable  Musculoskeletal: no muscle tenderness, no joint swelling or tenderness  Extremities: no pedal edema  Neurological/ Psychiatric: AxOx3, judgement and insight normal; moving all extremities  Skin: no rashes; no palpable lesions    Labs: all available labs reviewed                        11.9   6.82  )-----------( 148      ( 13 Aug 2018 05:45 )             37.2     0813    139  |  98  |  18  ----------------------------<  109<H>  4.0   |  34<H>  |  0.58    Ca    8.2<L>      13 Aug 2018 05:45  Phos  3.2       Mg     2.0     0812         Culture - Blood (collected 09 Aug 2018 12:32)  Source: .Blood None  Preliminary Report (10 Aug 2018 17:02):    No growth to date.    Culture - Urine (collected 09 Aug 2018 06:56)  Source: .Urine Clean Catch (Midstream)  Final Report (11 Aug 2018 13:42):    >100,000 CFU/ml Enterococcus faecalis  Organism: Enterococcus faecalis (11 Aug 2018 13:42)  Organism: Enterococcus faecalis (11 Aug 2018 13:42)      -  Ampicillin: S <=2 Predicts results to ampicillin/sulbactam, amoxacillin-clavulanate and  piperacillin-tazobactam.      -  Ciprofloxacin: S <=1      -  Levofloxacin: S 1      -  Nitrofurantoin: S <=32 Should not be used to treat pyelonephritis.      -  Tetra/Doxy: R >8      -  Vancomycin: S 4      Method Type: NOLA    Culture - Blood (collected 08 Aug 2018 21:50)  Source: .Blood Blood  Preliminary Report (10 Aug 2018 10:01):    No growth to date.    Culture - Blood (collected 08 Aug 2018 21:50)  Source: .Blood Blood  Preliminary Report (10 Aug 2018 10:01):    No growth to date.            Radiology: all available radiological tests reviewed    Advanced directives addressed: full resuscitation Patient is a 88y old  Male who presents with a chief complaint of chest pain (09 Aug 2018 08:47)    HPI:  89 y/o male with h/o A.fib on Eliquis w/ PPM, old CVA, DM, CAD, CHF was admitted on  from [facility] for chest pain in the lower central chest radiating to b/l arms. Also found to have temp of 102.8 in ER. He was noted with pyuria and given ceftriaxone IV. Urine culture was reported with EN spp and bx changed to cipro IV.  He had urinary frequency and dysuria; improving.    PMH: as above  PSH: as above  Meds: per reconciliation sheet, noted below  MEDICATIONS  (STANDING):  amoxicillin  875 milliGRAM(s)/clavulanate 1 Tablet(s) Oral every 12 hours  apixaban 2.5 milliGRAM(s) Oral every 12 hours  aspirin enteric coated 81 milliGRAM(s) Oral daily  dextrose 5%. 1000 milliLiter(s) (50 mL/Hr) IV Continuous <Continuous>  dextrose 50% Injectable 12.5 Gram(s) IV Push once  dextrose 50% Injectable 25 Gram(s) IV Push once  dextrose 50% Injectable 25 Gram(s) IV Push once  digoxin     Tablet 0.125 milliGRAM(s) Oral daily  docusate sodium 100 milliGRAM(s) Oral three times a day  doxazosin 2 milliGRAM(s) Oral at bedtime  enalapril 2.5 milliGRAM(s) Oral two times a day  escitalopram 10 milliGRAM(s) Oral daily  famotidine    Tablet 20 milliGRAM(s) Oral daily  furosemide    Tablet 60 milliGRAM(s) Oral daily  gabapentin 300 milliGRAM(s) Oral three times a day  insulin lispro (HumaLOG) corrective regimen sliding scale   SubCutaneous three times a day before meals  insulin lispro (HumaLOG) corrective regimen sliding scale   SubCutaneous at bedtime  lactobacillus acidophilus 1 Tablet(s) Oral daily  metoprolol succinate ER 25 milliGRAM(s) Oral daily  multivitamin/minerals 1 Tablet(s) Oral daily  polyethylene glycol 3350 17 Gram(s) Oral daily  ranolazine 500 milliGRAM(s) Oral two times a day  senna 2 Tablet(s) Oral at bedtime    MEDICATIONS  (PRN):  acetaminophen   Tablet 650 milliGRAM(s) Oral every 6 hours PRN For Temp greater than 38 C (100.4 F)  aluminum hydroxide/magnesium hydroxide/simethicone Suspension 30 milliLiter(s) Oral every 6 hours PRN Dyspepsia  dextrose 40% Gel 15 Gram(s) Oral once PRN Blood Glucose LESS THAN 70 milliGRAM(s)/deciliter  glucagon  Injectable 1 milliGRAM(s) IntraMuscular once PRN Glucose LESS THAN 70 milligrams/deciliter  guaiFENesin    Syrup 200 milliGRAM(s) Oral every 6 hours PRN Cough  magnesium hydroxide Suspension 30 milliLiter(s) Oral daily PRN Constipation    Allergies    No Known Allergies    Intolerances      Social: no smoking, no alcohol, no illegal drugs; no recent travel, no exposure to TB  FAMILY HISTORY:  No pertinent family history in first degree relatives    no history of premature cardiovascular disease in first degree relatives    ROS: the patient denies fever, no chills, no HA, no seizures, no dizziness, no sore throat, no nasal congestion, no blurry vision, no CP, no palpitations, no SOB, no cough, no abdominal pain, no diarrhea, no N/V, has dysuria, no leg pain, no claudication, no rash, no joint aches, no rectal pain or bleeding, no night sweats  All other systems reviewed and are negative    Vital Signs Last 24 Hrs  T(C): 36.2 (13 Aug 2018 09:58), Max: 36.6 (12 Aug 2018 20:52)  T(F): 97.2 (13 Aug 2018 09:58), Max: 97.8 (12 Aug 2018 20:52)  HR: 70 (13 Aug 2018 09:58) (70 - 70)  BP: 107/53 (13 Aug 2018 09:58) (101/45 - 111/57)  BP(mean): --  RR: 18 (13 Aug 2018 09:58) (16 - 18)  SpO2: 99% (13 Aug 2018 09:58) (99% - 100%)  Daily     Daily Weight in k.6 (13 Aug 2018 10:06)    PE:    Constitutional: frail looking  HEENT: NC/AT, EOMI, PERRLA, conjunctivae clear; ears and nose atraumatic; pharynx benign  Neck: supple; thyroid not palpable  Back: no tenderness  Respiratory: respiratory effort normal; clear to auscultation  Cardiovascular: S1S2 regular, no murmurs  Abdomen: soft, not tender, not distended, positive BS; no liver or spleen organomegaly  Genitourinary: no suprapubic tenderness  Lymphatic: no LN palpable  Musculoskeletal: no muscle tenderness, no joint swelling or tenderness  Extremities: no pedal edema  Neurological/ Psychiatric: AxOx3, judgement and insight normal; moving all extremities  Skin: no rashes; no palpable lesions    Labs: all available labs reviewed                        11.9   6.82  )-----------( 148      ( 13 Aug 2018 05:45 )             37.2     08-13    139  |  98  |  18  ----------------------------<  109<H>  4.0   |  34<H>  |  0.58    Ca    8.2<L>      13 Aug 2018 05:45  Phos  3.2     08-12  Mg     2.0     08-12         Culture - Blood (collected 09 Aug 2018 12:32)  Source: .Blood None  Preliminary Report (10 Aug 2018 17:02):    No growth to date.    Culture - Urine (collected 09 Aug 2018 06:56)  Source: .Urine Clean Catch (Midstream)  Final Report (11 Aug 2018 13:42):    >100,000 CFU/ml Enterococcus faecalis  Organism: Enterococcus faecalis (11 Aug 2018 13:42)  Organism: Enterococcus faecalis (11 Aug 2018 13:42)      -  Ampicillin: S <=2 Predicts results to ampicillin/sulbactam, amoxacillin-clavulanate and  piperacillin-tazobactam.      -  Ciprofloxacin: S <=1      -  Levofloxacin: S 1      -  Nitrofurantoin: S <=32 Should not be used to treat pyelonephritis.      -  Tetra/Doxy: R >8      -  Vancomycin: S 4      Method Type: NOLA    Culture - Blood (collected 08 Aug 2018 21:50)  Source: .Blood Blood  Preliminary Report (10 Aug 2018 10:01):    No growth to date.    Culture - Blood (collected 08 Aug 2018 21:50)  Source: .Blood Blood  Preliminary Report (10 Aug 2018 10:01):    No growth to date.            Radiology: all available radiological tests reviewed    Advanced directives addressed: full resuscitation

## 2018-08-13 NOTE — DIETITIAN INITIAL EVALUATION ADULT. - OTHER INFO
Pt seen for CHF education, screened for malnutrition due to NFPE and assessment. Pt's pmhx noted below. Sig for Afib, DM, HTN, CAD. Pt reports appetite has been reduced lately, pt states he has trouble completing meals due to filling up too quickly. Pt States he thinks he has lost weight but unsure about UBW or weightloss amounts. Pt denies n/v/d/c. Pt c/o chewing and swallowing difficulty, but able to tolerate puree diet. Pt baden 11, pt noted with stage I on sacrum, left and right heel. Pt with 1+ edema in left and right ankle. NFPE performed. Pt with severe muscle wasting in clavicle, temple, scapula, deltoid and interossous, moderate to severe in calves and quads. pt with severe fat wasting in triceps and ribs. PT meets criteria for severe protein-calorie malnutrition in the context of chronic illness. recommend Glucerna BID, Encourage PO intake, monitor weight and intake. will continue to monitor pt's nutritional status.

## 2018-08-13 NOTE — PROGRESS NOTE ADULT - PROBLEM SELECTOR PROBLEM 4
Chronic systolic (congestive) heart failure

## 2018-08-13 NOTE — CONSULT NOTE ADULT - ASSESSMENT
87 y/o male with h/o A.fib on Eliquis w/ PPM, old CVA, DM, CAD, CHF was admitted on 8/8 from [facility] for chest pain in the lower central chest radiating to b/l arms. Also found to have temp of 102.8 in ER. He was noted with pyuria and given ceftriaxone IV. Urine culture was reported with EN spp and bx changed to cipro IV.    1. Febrile syndrome. Pyuria. UTI with ENFAE. 87 y/o male with h/o A.fib on Eliquis w/ PPM, old CVA, DM, CAD, CHF was admitted on 8/8 from [facility] for chest pain in the lower central chest radiating to b/l arms. Also found to have temp of 102.8 in ER. He was noted with pyuria and given ceftriaxone IV. Urine culture was reported with EN spp and bx changed to cipro IV.    1. Febrile syndrome. Dysuria. Pyuria. UTI with ENFAE.   -possible sepsis  -s/p ceftriaxone   -on cipro IV # 1-2  -tolerating abx well so far; no side effects noted  -will change abx to augmentin 875 mg PO q12h  -reason for abx use and side effects reviewed with patient; monitor BMP   -old chart reviewed to assess prior cultures  -monitor temps  -f/u CBC  -supportive care  2. Other issues:   -care per medicine

## 2018-08-13 NOTE — PROGRESS NOTE ADULT - PROBLEM SELECTOR PLAN 2
Paced. Interrogation revealed no events.

## 2018-08-13 NOTE — PROGRESS NOTE ADULT - PROBLEM SELECTOR PLAN 1
No recurrence
No recurrence. Given age and comorbidities he is a poor candidate for revascularization.  Will add medical therapy and monitor his response.
recurrent , possible mucsculo skeletal origin , ? GERD . Given age and comorbidities he is a poor candidate for revascularization. will obtain troponin twice  ,  tolerating ranexa well.
No recurrence. Given age and comorbidities he is a poor candidate for revascularization.  tolerating ranexa well.

## 2018-08-13 NOTE — PROGRESS NOTE ADULT - PROBLEM SELECTOR PLAN 4
On appropriate therapy and doing well.
Currently on ACB and BB .  will change metoprolol to long acting.
On appropriate therapy and doing well.

## 2018-08-13 NOTE — CHART NOTE - NSCHARTNOTEFT_GEN_A_CORE
Upon Nutritional Assessment by the Registered Dietitian your patient was determined to meet criteria / has evidence of the following diagnosis/diagnoses:          [ ]  Mild Protein Calorie Malnutrition        [ ]  Moderate Protein Calorie Malnutrition        [x] Severe Protein Calorie Malnutrition        [ ] Unspecified Protein Calorie Malnutrition        [ ] Underweight / BMI <19        [ ] Morbid Obesity / BMI > 40      Findings as based on:  •  Comprehensive nutrition assessment and consultation  •  Calorie counts (nutrient intake analysis)  •  Food acceptance and intake status from observations by staff  •  Follow up  •  Patient education  •  Intervention secondary to interdisciplinary rounds  •   concerns      Treatment:    The following diet has been recommended:  - encourage PO intake  -Glucerna BID    PROVIDER Section:     By signing this assessment you are acknowledging and agree with the diagnosis/diagnoses assigned by the Registered Dietitian    Comments:

## 2018-08-13 NOTE — DIETITIAN INITIAL EVALUATION ADULT. - PERTINENT MEDS FT
Eliquis, Ecotrin, Cipro, Miralax, senna, Robitussin, Toprol xl, Ranexa, Lactobacillus, MVI, Tylenol, colace, Vasotec, Lexapro, humalog

## 2018-08-13 NOTE — DIETITIAN INITIAL EVALUATION ADULT. - NS AS NUTRI DX NUTRIENT
Meets criteria for severe protein-calorie malnutriitn in the context of chronic illness/Malnutrition

## 2018-08-14 ENCOUNTER — TRANSCRIPTION ENCOUNTER (OUTPATIENT)
Age: 83
End: 2018-08-14

## 2018-08-14 VITALS — WEIGHT: 147.93 LBS

## 2018-08-14 LAB
CULTURE RESULTS: SIGNIFICANT CHANGE UP
GLUCOSE BLDC GLUCOMTR-MCNC: 106 MG/DL — HIGH (ref 70–99)
GLUCOSE BLDC GLUCOMTR-MCNC: 115 MG/DL — HIGH (ref 70–99)
SPECIMEN SOURCE: SIGNIFICANT CHANGE UP

## 2018-08-14 RX ORDER — RANOLAZINE 500 MG/1
1 TABLET, FILM COATED, EXTENDED RELEASE ORAL
Qty: 0 | Refills: 0 | COMMUNITY
Start: 2018-08-14

## 2018-08-14 RX ORDER — METFORMIN HYDROCHLORIDE 850 MG/1
1 TABLET ORAL
Qty: 0 | Refills: 0 | COMMUNITY
Start: 2018-08-14

## 2018-08-14 RX ORDER — RANOLAZINE 500 MG/1
1 TABLET, FILM COATED, EXTENDED RELEASE ORAL
Qty: 60 | Refills: 0 | OUTPATIENT
Start: 2018-08-14 | End: 2018-09-12

## 2018-08-14 RX ORDER — APIXABAN 2.5 MG/1
1 TABLET, FILM COATED ORAL
Qty: 30 | Refills: 0 | OUTPATIENT
Start: 2018-08-14 | End: 2018-09-12

## 2018-08-14 RX ORDER — MULTIVIT-MIN/FERROUS GLUCONATE 9 MG/15 ML
1 LIQUID (ML) ORAL
Qty: 0 | Refills: 0 | COMMUNITY
Start: 2018-08-14

## 2018-08-14 RX ORDER — METFORMIN HYDROCHLORIDE 850 MG/1
1 TABLET ORAL
Qty: 0 | Refills: 0 | COMMUNITY

## 2018-08-14 RX ORDER — LACTOBACILLUS ACIDOPHILUS 100MM CELL
1 CAPSULE ORAL
Qty: 9 | Refills: 0 | OUTPATIENT
Start: 2018-08-14 | End: 2018-08-22

## 2018-08-14 RX ADMIN — ESCITALOPRAM OXALATE 10 MILLIGRAM(S): 10 TABLET, FILM COATED ORAL at 11:48

## 2018-08-14 RX ADMIN — Medication 100 MILLIGRAM(S): at 05:32

## 2018-08-14 RX ADMIN — APIXABAN 2.5 MILLIGRAM(S): 2.5 TABLET, FILM COATED ORAL at 05:32

## 2018-08-14 RX ADMIN — Medication 0.12 MILLIGRAM(S): at 05:33

## 2018-08-14 RX ADMIN — Medication 1 TABLET(S): at 11:48

## 2018-08-14 RX ADMIN — GABAPENTIN 300 MILLIGRAM(S): 400 CAPSULE ORAL at 05:32

## 2018-08-14 RX ADMIN — Medication 60 MILLIGRAM(S): at 05:33

## 2018-08-14 RX ADMIN — POLYETHYLENE GLYCOL 3350 17 GRAM(S): 17 POWDER, FOR SOLUTION ORAL at 11:48

## 2018-08-14 RX ADMIN — RANOLAZINE 500 MILLIGRAM(S): 500 TABLET, FILM COATED, EXTENDED RELEASE ORAL at 05:32

## 2018-08-14 RX ADMIN — FAMOTIDINE 20 MILLIGRAM(S): 10 INJECTION INTRAVENOUS at 11:48

## 2018-08-14 RX ADMIN — Medication 1 TABLET(S): at 05:32

## 2018-08-14 RX ADMIN — Medication 81 MILLIGRAM(S): at 11:48

## 2018-08-14 NOTE — DISCHARGE NOTE ADULT - PATIENT PORTAL LINK FT
You can access the [facility] Patient Portal, offered by [facility], by registering with the following website: [email or web address]

## 2018-08-14 NOTE — DISCHARGE NOTE ADULT - MEDICATION SUMMARY - MEDICATIONS TO CHANGE
I will SWITCH the dose or number of times a day I take the medications listed below when I get home from the hospital:    apixaban 2.5 mg oral tablet  -- 1 tab(s) by mouth every 12 hours

## 2018-08-14 NOTE — PROGRESS NOTE ADULT - ASSESSMENT
87 y/o male with a PMHx of A. fib on Eliquis w/ PPM, hx of CVA, DM, CAD, CHF and other comorbidities brought from [facility] [place] for c/o chest pain and admitted with :    1) Chest Pain: No acute MI ; likely sec to GERD  admit to tele  echo: EF 20-25%  cardio consult appreciated  cont asa, BB, eliquis    2) Fever + leukocytosis: Afebrile today  sec to UTI  cont rocephin   urine cx : E fecalis  tylenol prn    3) DM 2:  hold metformin  ISS    4) Hx oF A fib:  cont eliquis    5) Hx of CHF: Chronic Systolic. EF 20-25%   cont lasix    6) DVT PPX:  on eliquis    7) Cough: robitussin prn    poc discussed with pt, team
87 y/o male with a PMHx of A. fib on Eliquis w/ PPM, hx of CVA, DM, CAD, CHF and other comorbidities brought from [facility] for c/o chest pain and admitted with :    1) Chest Pain: No acute MI ; likely sec to GERD  admit to tele  echo: EF 20-25%  cardio consult appreciated  cont asa, BB, eliquis    2) Fever + leukocytosis: Afebrile today  sec to UTI  urine cx : E fecalis sx to cipro; start cipro; ID eval  tylenol prn    3) DM 2:  hold metformin  ISS    4) Hx oF A fib:  cont eliquis    5) Hx of CHF: Chronic Systolic. EF 20-25%   cont lasix    6) DVT PPX:  on eliquis    7) Cough: robitussin prn    poc discussed with pt, team
87 y/o male with h/o A.fib on Eliquis w/ PPM, old CVA, DM, CAD, CHF was admitted on 8/8 from [facility] for chest pain in the lower central chest radiating to b/l arms. Also found to have temp of 102.8 in ER. He was noted with pyuria and given ceftriaxone IV. Urine culture was reported with EN spp and bx changed to cipro IV.    1. Febrile syndrome resolving. Pyuria. UTI with ENFAE.   -possible sepsis resolving  -s/p ceftriaxone   -s/p cipro IV # 1-2 days  -on augmentin PO # 1  -tolerating abx well so far; no side effects noted  -continue on oral abx for 10 days  -monitor temps  -f/u CBC  -supportive care  2. Other issues:   -care per medicine
89 y/o male with a PMHx of A. fib on Eliquis w/ PPM, hx of CVA, DM, CAD, CHF and other comorbidities brought from [facility] for c/o chest pain and admitted with :    1) Chest Pain: No acute MI ; likely sec to GERD  admit to tele  echo: EF 20-25%  cardio consult appreciated  cont asa, BB, eliquis    2) Fever + leukocytosis:  sec to UTI  cont rocephin   urine cx : E fecalis  tylenol prn    3) DM 2:  hold metformin  ISS    4) Hx oF A fib:  cont eliquis    5) Hx of CHF: Chronic Systolic. EF 20-25%   cont lasix    6) DVT PPX:  on eliquis    poc discussed with pt, team
89 y/o male with a PMHx of A. fib on Eliquis w/ PPM, hx of CVA, DM, CAD, CHF and other comorbidities brought from [facility] for c/o chest pain and admitted with :    1) Chest Pain: No acute MI ; likely sec to GERD  admit to tele  echo: EF 20-25%  cardio consult appreciated  cont asa, BB, eliquis    2) Fever + leukocytosis: Afebrile today  sec to UTI  urine cx : E fecalis ; janelle changed to Augmentin as per ID  tylenol prn    3) DM 2:  hold metformin  ISS    4) Hx oF A fib:  cont eliquis    5) Hx of CHF: Chronic Systolic. EF 20-25%   cont lasix    6) DVT PPX:  on eliquis    7) Cough: robitussin prn    poc discussed with pt, team, Dr. Falcon

## 2018-08-14 NOTE — PROGRESS NOTE ADULT - PROVIDER SPECIALTY LIST ADULT
Cardiology
Hospitalist
Infectious Disease
Hospitalist

## 2018-08-14 NOTE — DISCHARGE NOTE ADULT - CARE PLAN
Principal Discharge DX:	Afib  Goal:	Take Eliquis 5mg  Assessment and plan of treatment:	- started on Ranexa 500mg PO BID for chest pain  Secondary Diagnosis:	Chronic systolic (congestive) heart failure  Goal:	Continue with home dose Lasix 60mg daily  Assessment and plan of treatment:	- Continue with Enalapril 2.5mg two times a day (Hold if SBP <100)  Secondary Diagnosis:	Urinary tract infection  Goal:	Continue Augmentin 1 tab every 12 hours for 9 more days (until August 22nd)

## 2018-08-14 NOTE — DISCHARGE NOTE ADULT - CONDITIONS AT DISCHARGE
Patient stable for discharge. VSS. Patient discharged to [facility]. No cardiology appointment needed.

## 2018-08-14 NOTE — DISCHARGE NOTE ADULT - PLAN OF CARE
Take Eliquis 5mg - started on Ranexa 500mg PO BID for chest pain Continue with home dose Lasix 60mg daily - Continue with Enalapril 2.5mg two times a day (Hold if SBP <100) Continue Augmentin 1 tab every 12 hours for 9 more days (until August 22nd)

## 2018-08-14 NOTE — DISCHARGE NOTE ADULT - HOSPITAL COURSE
HPI from ED: 89 y/o male with a PMHx of A. fib on Eliquis w/ PPM, hx of CVA, DM, CAD, CHF and other comorbidities brought from [facility] for c/o chest pain in the lower central chest radiating to b/l arms. Also found to have temp of 102.8 in ER.     8/14 - Pt. seen and examined with Dr. Westfall. Denies any chest pain/SOB.     REVIEW OF SYSTEMS: all negative except for comments above.    Vital Signs Last 24 Hrs  T(C): 36.3 (14 Aug 2018 10:09), Max: 36.7 (13 Aug 2018 19:34)  T(F): 97.3 (14 Aug 2018 10:09), Max: 98.1 (13 Aug 2018 19:34)  HR: 70 (14 Aug 2018 10:09) (70 - 71)  BP: 106/51 (14 Aug 2018 10:09) (97/46 - 125/50)  BP(mean): --  RR: 18 (14 Aug 2018 10:09) (17 - 18)  SpO2: 100% (14 Aug 2018 10:09) (100% - 100%)    PHYSICAL EXAM:    GEN: lying in bed, NAD, frail and weak appearing  HEENT:   NC/AT  CV:  +S1, +S2, RRR  RESP:  CTa B/L  GI:  abdomen soft, non-tender, non-distended, normoactive BS  RECTAL:  not examined  MSK:   normal muscle tone  EXT:  no edema  NEURO:  AAOX2, no focal neurological deficits  SKIN:  no rashes    Labs & Radiology reviewed.      HOSPITAL COURSE BY PROBLEM:    # Chest Pain  - No acute MI ; likely sec to GERD  - no events on tele, v-paced/afib 60s-70s  - echo: EF 20-25%  - cardio consult appreciated  - cont asa, BB, Eliquis (increased to appropriate dosing 5mg)    # Fever + leukocytosis 2/2 UTI  - Afebrile   - urine cx : E fecalis ; cipro changed to Augmentin as per ID for total course of 10 days    # DM 2  - c/w home meds  - HgbA1c = 5.7    # Hx oF A fib:  - cont Eliquis (increased to appropriate dose)    # Hx of CHF: Chronic Systolic. EF 20-25%  - cont lasix    # Dispo  - discharge back to [facility] today on PO antibiotics and therapeutic dose of Eliquis    7) Cough: robitussin prn    poc discussed with pt, team, Dr. Falcon HPI from ED: 87 y/o male with a PMHx of A. fib on Eliquis w/ PPM, hx of CVA, DM, CAD, CHF and other comorbidities brought from [facility] for c/o chest pain in the lower central chest radiating to b/l arms. Also found to have temp of 102.8 in ER.     8/14 - Pt. seen and examined with Dr. Westfall. Denies any chest pain/SOB.     REVIEW OF SYSTEMS: all negative except for comments above.    Vital Signs Last 24 Hrs  T(C): 36.3 (14 Aug 2018 10:09), Max: 36.7 (13 Aug 2018 19:34)  T(F): 97.3 (14 Aug 2018 10:09), Max: 98.1 (13 Aug 2018 19:34)  HR: 70 (14 Aug 2018 10:09) (70 - 71)  BP: 106/51 (14 Aug 2018 10:09) (97/46 - 125/50)  BP(mean): --  RR: 18 (14 Aug 2018 10:09) (17 - 18)  SpO2: 100% (14 Aug 2018 10:09) (100% - 100%)    PHYSICAL EXAM:    GEN: lying in bed, NAD, frail and weak appearing  HEENT:   NC/AT  CV:  +S1, +S2, RRR  RESP:  CTa B/L  GI:  abdomen soft, non-tender, non-distended, normoactive BS  RECTAL:  not examined  MSK:   normal muscle tone  EXT:  no edema  NEURO:  AAOX2, no focal neurological deficits  SKIN:  no rashes    Labs & Radiology reviewed.      HOSPITAL COURSE BY PROBLEM:    # Chest Pain  - No acute MI ; likely sec to GERD  - no events on tele, v-paced/afib 60s-70s  - echo: EF 20-25%  - cardio consult appreciated  - cont asa, BB, Eliquis (increased to appropriate dosing 5mg)    # Fever + leukocytosis 2/2 UTI  - Afebrile   - urine cx : E fecalis ; cipro changed to Augmentin as per ID for total course of 10 days    # DM 2  - c/w home meds  - HgbA1c = 5.7    # Hx oF A fib:  - cont Eliquis (increased to appropriate dose)    # Hx of CHF: Chronic Systolic. EF 20-25%  - cont lasix    # Dispo  - discharge back to [facility] today on PO antibiotics and therapeutic dose of Eliquis    Cough: robitussin prn    poc discussed with pt, team, Dr. Falcon    Patient seen and examined with the NP. Agree with above plan of care which was discussed with the patient, family, team and NP.     total time spent 36 min

## 2018-08-14 NOTE — DISCHARGE NOTE ADULT - MEDICATION SUMMARY - MEDICATIONS TO TAKE
I will START or STAY ON the medications listed below when I get home from the hospital:    acetaminophen 650 mg oral tablet  -- 1 tab(s) by mouth every 6 hours, As Needed for pain  -- Indication: For for pain as needed    aspirin 81 mg oral delayed release tablet  -- 1 tab(s) by mouth once a day  -- Indication: For for your heart    enalapril 2.5 mg oral tablet  -- 1 tab(s) by mouth 2 times a day  -- Indication: For for your heart    magnesium hydroxide 8% oral suspension  -- 30 milliliter(s) by mouth once a day, As needed, Constipation  -- Indication: For Antacids    Cardura 2 mg oral tablet  -- 1 tab(s) by mouth once a day (at bedtime)  -- Indication: For for your heart    ranolazine 500 mg oral tablet, extended release  -- 1 tab(s) by mouth 2 times a day  -- Indication: For for your heart    digoxin 125 mcg (0.125 mg) oral tablet  -- 1 tab(s) by mouth once a day  -- Indication: For for your heart    apixaban 5 mg oral tablet  -- 1 tab(s) by mouth 2 times a day  -- Indication: For Anticoagulant    gabapentin 300 mg oral capsule  -- 1 cap(s) by mouth 3 times a day  -- Indication: For for nerve pain    Lexapro 10 mg oral tablet  -- 1 tab(s) by mouth once a day  -- Indication: For Antidepressant    metFORMIN 500 mg oral tablet  -- 1 tab(s) by mouth once a day  -- Indication: For Antidiabetic    metoprolol tartrate 25 mg oral tablet  -- 0.5 tab(s) by mouth 2 times a day  -- Indication: For for your heart    DuoNeb 0.5 mg-2.5 mg/3 mL inhalation solution  -- 3 milliliter(s) inhaled 4 times a day  -- Indication: For inhaler    furosemide  -- 60 milligram(s) by mouth once a day  -- Indication: For diuretic    guaiFENesin 100 mg/5 mL oral liquid  -- 10 milliliter(s) by mouth every 6 hours, As needed, Cough  -- Indication: For As needed for cough    Pepcid 20 mg oral tablet  -- 1 tab(s) by mouth once a day  -- Indication: For Anatacids    docusate sodium 100 mg oral capsule  -- 1 cap(s) by mouth 3 times a day  -- Indication: For laxative    Klor-Con 20 mEq oral powder for reconstitution  -- 20 milliequivalent(s) by mouth once a day  -- Indication: For potassium    amoxicillin-clavulanate 875 mg-125 mg oral tablet  -- 1 tab(s) by mouth every 12 hours  -- Indication: For Antibiotic    lactobacillus acidophilus oral capsule  -- 1 tab(s) by mouth once a day   -- Indication: For pro biotic    Multiple Vitamins with Minerals oral tablet  -- 1 tab(s) by mouth once a day  -- Indication: For vitamin    Vitamin D3 50,000 intl units oral capsule  -- 1 cap(s) by mouth once a week on wednesday  -- Indication: For vitamin

## 2018-08-14 NOTE — PROGRESS NOTE ADULT - SUBJECTIVE AND OBJECTIVE BOX
Date of service: 08-14-18 @ 08:36    Lying in bed in NAD  Weak looking  [place] pain    ROS: no fever or chills; denies dizziness, no HA, no SOB or cough, no abdominal pain, no diarrhea or constipation; no dysuria, no legs pain, no rashes    MEDICATIONS  (STANDING):  amoxicillin  875 milliGRAM(s)/clavulanate 1 Tablet(s) Oral every 12 hours  apixaban 2.5 milliGRAM(s) Oral every 12 hours  aspirin enteric coated 81 milliGRAM(s) Oral daily  dextrose 5%. 1000 milliLiter(s) (50 mL/Hr) IV Continuous <Continuous>  dextrose 50% Injectable 12.5 Gram(s) IV Push once  dextrose 50% Injectable 25 Gram(s) IV Push once  dextrose 50% Injectable 25 Gram(s) IV Push once  digoxin     Tablet 0.125 milliGRAM(s) Oral daily  docusate sodium 100 milliGRAM(s) Oral three times a day  doxazosin 2 milliGRAM(s) Oral at bedtime  enalapril 2.5 milliGRAM(s) Oral two times a day  escitalopram 10 milliGRAM(s) Oral daily  famotidine    Tablet 20 milliGRAM(s) Oral daily  furosemide    Tablet 60 milliGRAM(s) Oral daily  gabapentin 300 milliGRAM(s) Oral three times a day  insulin lispro (HumaLOG) corrective regimen sliding scale   SubCutaneous three times a day before meals  insulin lispro (HumaLOG) corrective regimen sliding scale   SubCutaneous at bedtime  lactobacillus acidophilus 1 Tablet(s) Oral daily  metoprolol succinate ER 25 milliGRAM(s) Oral daily  multivitamin/minerals 1 Tablet(s) Oral daily  polyethylene glycol 3350 17 Gram(s) Oral daily  ranolazine 500 milliGRAM(s) Oral two times a day  senna 2 Tablet(s) Oral at bedtime      Vital Signs Last 24 Hrs  T(C): 36.7 (14 Aug 2018 05:20), Max: 36.7 (13 Aug 2018 19:34)  T(F): 98 (14 Aug 2018 05:20), Max: 98.1 (13 Aug 2018 19:34)  HR: 70 (14 Aug 2018 05:20) (70 - 71)  BP: 104/43 (14 Aug 2018 05:20) (97/46 - 125/50)  BP(mean): --  RR: 18 (14 Aug 2018 05:20) (17 - 18)  SpO2: 100% (14 Aug 2018 05:20) (99% - 100%)    Physical Exam:      Constitutional: frail looking  HEENT: NC/AT, EOMI, PERRLA, conjunctivae clear  Neck: supple; thyroid not palpable  Back: no tenderness  Respiratory: respiratory effort normal; clear to auscultation  Cardiovascular: S1S2 regular, no murmurs  Abdomen: soft, not tender, not distended, positive BS;  Genitourinary: no suprapubic tenderness  Lymphatic: no LN palpable  Musculoskeletal: no muscle tenderness, no joint swelling or tenderness  Extremities: no pedal edema  Neurological/ Psychiatric: AxOx3, moving all extremities  Skin: no rashes; no palpable lesions    Labs: all available labs reviewed                        11.9   6.82  )-----------( 148      ( 13 Aug 2018 05:45 )             37.2     08-13    139  |  98  |  18  ----------------------------<  109<H>  4.0   |  34<H>  |  0.58    Ca    8.2<L>      13 Aug 2018 05:45  Phos  3.2     08-12  Mg     2.0     08-12         Culture - Blood (collected 09 Aug 2018 12:32)  Source: .Blood None  Preliminary Report (10 Aug 2018 17:02):    No growth to date.    Culture - Urine (collected 09 Aug 2018 06:56)  Source: .Urine Clean Catch (Midstream)  Final Report (11 Aug 2018 13:42):    >100,000 CFU/ml Enterococcus faecalis  Organism: Enterococcus faecalis (11 Aug 2018 13:42)  Organism: Enterococcus faecalis (11 Aug 2018 13:42)      -  Ampicillin: S <=2 Predicts results to ampicillin/sulbactam, amoxacillin-clavulanate and  piperacillin-tazobactam.      -  Ciprofloxacin: S <=1      -  Levofloxacin: S 1      -  Nitrofurantoin: S <=32 Should not be used to treat pyelonephritis.      -  Tetra/Doxy: R >8      -  Vancomycin: S 4      Method Type: NOLA    Culture - Blood (collected 08 Aug 2018 21:50)  Source: .Blood Blood  Preliminary Report (10 Aug 2018 10:01):    No growth to date.    Culture - Blood (collected 08 Aug 2018 21:50)  Source: .Blood Blood  Preliminary Report (10 Aug 2018 10:01):    No growth to date.            Radiology: all available radiological tests reviewed    Advanced directives addressed: full resuscitation
HPI:  87 y/o male with a PMHx of A. priti on Eliquis w/ PPM, hx of CVA, DM, CAD, CHF and other comorbidities brought from [facility] for c/o chest pain in the lower central chest radiating to b/l arms. Also found to have temp of 102.8 in ER last night.   No active chest pain at this time.   No ABX given in ER. (09 Aug 2018 08:47)  Cardiology evaluation requested. He continues to complain of pain despite morphine IV. Paced rhythm  8/10/18: Pt feels improved. No arrythmia. PM interrogated  8/11/18: No new complaints.  breathing well.  Tired today as he did not sleep well.   8/12/18: energy level slightly improved.  No CP or SOB.  8/13/18 events noted , patient did have sharp pain retrosternal area , increased with movement , resolved ,  patient feel uncomfortable when he coughs .      PAST MEDICAL & SURGICAL HISTORY:  Pacemaker  Afib  Diabetes  MI (myocardial infarction)  BPH (benign prostatic hyperplasia)  Hypertension  ACS (acute coronary syndrome)  CAD (coronary artery disease)    FAMILY HISTORY:  No pertinent family history in first degree relatives      ALLERGIES:  Allergies    No Known Allergies    Intolerances    MEDICATIONS  (STANDING):  apixaban 2.5 milliGRAM(s) Oral every 12 hours  aspirin enteric coated 81 milliGRAM(s) Oral daily  ciprofloxacin   IVPB 400 milliGRAM(s) IV Intermittent every 12 hours  dextrose 5%. 1000 milliLiter(s) (50 mL/Hr) IV Continuous <Continuous>  dextrose 50% Injectable 12.5 Gram(s) IV Push once  dextrose 50% Injectable 25 Gram(s) IV Push once  dextrose 50% Injectable 25 Gram(s) IV Push once  digoxin     Tablet 0.125 milliGRAM(s) Oral daily  docusate sodium 100 milliGRAM(s) Oral three times a day  doxazosin 2 milliGRAM(s) Oral at bedtime  enalapril 2.5 milliGRAM(s) Oral two times a day  escitalopram 10 milliGRAM(s) Oral daily  famotidine    Tablet 20 milliGRAM(s) Oral daily  furosemide    Tablet 60 milliGRAM(s) Oral daily  gabapentin 300 milliGRAM(s) Oral three times a day  insulin lispro (HumaLOG) corrective regimen sliding scale   SubCutaneous three times a day before meals  insulin lispro (HumaLOG) corrective regimen sliding scale   SubCutaneous at bedtime  lactobacillus acidophilus 1 Tablet(s) Oral daily  metoprolol succinate ER 25 milliGRAM(s) Oral daily  multivitamin/minerals 1 Tablet(s) Oral daily  polyethylene glycol 3350 17 Gram(s) Oral daily  ranolazine 500 milliGRAM(s) Oral two times a day  senna 2 Tablet(s) Oral at bedtime    MEDICATIONS  (PRN):  acetaminophen   Tablet 650 milliGRAM(s) Oral every 6 hours PRN For Temp greater than 38 C (100.4 F)  aluminum hydroxide/magnesium hydroxide/simethicone Suspension 30 milliLiter(s) Oral every 6 hours PRN Dyspepsia  dextrose 40% Gel 15 Gram(s) Oral once PRN Blood Glucose LESS THAN 70 milliGRAM(s)/deciliter  glucagon  Injectable 1 milliGRAM(s) IntraMuscular once PRN Glucose LESS THAN 70 milligrams/deciliter  guaiFENesin    Syrup 200 milliGRAM(s) Oral every 6 hours PRN Cough  magnesium hydroxide Suspension 30 milliLiter(s) Oral daily PRN Constipation      Vital Signs Last 24 Hrs  T(C): 36.6 (13 Aug 2018 04:50), Max: 36.6 (12 Aug 2018 20:52)  T(F): 97.8 (13 Aug 2018 04:50), Max: 97.8 (12 Aug 2018 20:52)  HR: 70 (13 Aug 2018 04:50) (70 - 70)  BP: 106/50 (13 Aug 2018 04:50) (101/45 - 111/57)  BP(mean): --  RR: 18 (13 Aug 2018 04:50) (16 - 18)  SpO2: 99% (13 Aug 2018 04:50) (99% - 100%)    I&O's Summary      PHYSICAL EXAM:    Constitutional: NAD, awake, well-developed, comfortable in hospital bed.  HEENT: PERR, EOMI,  No oral cyananosis.  Neck:  supple,  No JVD  Respiratory: Breath sounds are clear bilaterally  Cardiovascular: S1 and S2, IR, IR  Gastrointestinal: Bowel Sounds present, soft,  Extremities: No peripheral edema. No clubbing or cyanosis.  Vascular: 1+ peripheral pulses  Musculoskeletal: no calf tenderness.  Skin: No rashes.      LABS: All Labs Reviewed:                          11.9   6.82  )-----------( 148      ( 13 Aug 2018 05:45 )             37.2     08-13    139  |  98  |  18  ----------------------------<  109<H>  4.0   |  34<H>  |  0.58    Ca    8.2<L>      13 Aug 2018 05:45  Phos  3.2     08-12  Mg     2.0     08-12      CARDIAC MARKERS ( 12 Aug 2018 19:54 )  0.032 ng/mL / x     / x     / x     / x          < from: Transthoracic Echocardiogram (08.09.18 @ 11:05) >   Summary     Normal appearing mitral valve structure and function.   Moderate (2+) mitral regurgitation is present.   Mild mitral annular calcification is present.   Fibrocalcific changes noted to the Aortic valve leaflets with preserved   leaflet excursion.   Normal appearing tricuspid valve structure and function.   Moderate (2+) tricuspid valve regurgitation is present.   Normal appearing pulmonic valve structure and function.   The left atrium is moderately dilated.   The left ventricle cavity is mildly dilated.   Mild concentric left ventricular hypertrophy is present.   Estimated left ventricular ejection fraction is 20-25 %.   The right atrium appears mildly dilated.   A device wire is seen in the RV and RA.   The IVC is dilated with decreased respiratory variation   Pleural effusion - is present.    < end of copied text >    < from: 12 Lead ECG (08.12.18 @ 18:44) >  Ventricular-paced rhythm  Abnormal ECG      Monitor paced rhythm able  Confirmed by NAHUN JACOBS MD ([id number]) on 8/13/2018 8:40:15 AM    < end of copied text >
HPI:  87 y/o male with a PMHx of SPENSER marcos on Eliquis w/ PPM, hx of CVA, DM, CAD, CHF and other comorbidities brought from [facility] for c/o chest pain in the lower central chest radiating to b/l arms. Also found to have temp of 102.8 in ER last night.   No active chest pain at this time.   No ABX given in ER. (09 Aug 2018 08:47)  Cardiology evaluation requested. He continues to complain of pain despite morphine IV. Paced rhythm  8/10/18: Pt feels improved. No arrythmia. PM interrogated  18: No new complaints.  breathing well.  Tired today as he did not sleep well.       PAST MEDICAL & SURGICAL HISTORY:  Pacemaker  Afib  Diabetes  MI (myocardial infarction)  BPH (benign prostatic hyperplasia)  Hypertension  ACS (acute coronary syndrome)  CAD (coronary artery disease)    FAMILY HISTORY:  No pertinent family history in first degree relatives      ALLERGIES:  Allergies    No Known Allergies    Intolerances        MEDICATIONS  (STANDING):  apixaban 2.5 milliGRAM(s) Oral every 12 hours  aspirin enteric coated 81 milliGRAM(s) Oral daily  cefTRIAXone Injectable. 1000 milliGRAM(s) IV Push every 24 hours  cefTRIAXone Injectable.      dextrose 5%. 1000 milliLiter(s) (50 mL/Hr) IV Continuous <Continuous>  dextrose 50% Injectable 12.5 Gram(s) IV Push once  dextrose 50% Injectable 25 Gram(s) IV Push once  dextrose 50% Injectable 25 Gram(s) IV Push once  digoxin     Tablet 0.125 milliGRAM(s) Oral daily  docusate sodium 100 milliGRAM(s) Oral three times a day  doxazosin 2 milliGRAM(s) Oral at bedtime  enalapril 2.5 milliGRAM(s) Oral two times a day  escitalopram 10 milliGRAM(s) Oral daily  famotidine    Tablet 20 milliGRAM(s) Oral daily  furosemide    Tablet 60 milliGRAM(s) Oral daily  gabapentin 300 milliGRAM(s) Oral three times a day  insulin lispro (HumaLOG) corrective regimen sliding scale   SubCutaneous three times a day before meals  insulin lispro (HumaLOG) corrective regimen sliding scale   SubCutaneous at bedtime  lactobacillus acidophilus 1 Tablet(s) Oral daily  metoprolol tartrate 12.5 milliGRAM(s) Oral two times a day  multivitamin/minerals 1 Tablet(s) Oral daily    MEDICATIONS  (PRN):  acetaminophen   Tablet 650 milliGRAM(s) Oral every 6 hours PRN For Temp greater than 38 C (100.4 F)  aluminum hydroxide/magnesium hydroxide/simethicone Suspension 30 milliLiter(s) Oral every 6 hours PRN Dyspepsia  dextrose 40% Gel 15 Gram(s) Oral once PRN Blood Glucose LESS THAN 70 milliGRAM(s)/deciliter  glucagon  Injectable 1 milliGRAM(s) IntraMuscular once PRN Glucose LESS THAN 70 milligrams/deciliter  guaiFENesin    Syrup 200 milliGRAM(s) Oral every 6 hours PRN Cough  magnesium hydroxide Suspension 30 milliLiter(s) Oral daily PRN Constipation      Vital Signs Last 24 Hrs  T(C): 36.4 (11 Aug 2018 16:42), Max: 37.2 (10 Aug 2018 20:59)  T(F): 97.6 (11 Aug 2018 16:42), Max: 99 (10 Aug 2018 20:59)  HR: 71 (11 Aug 2018 16:42) (67 - 86)  BP: 103/51 (11 Aug 2018 16:42) (101/74 - 129/53)  BP(mean): --  RR: 18 (11 Aug 2018 09:55) (18 - 18)  SpO2: 98% (11 Aug 2018 16:42) (98% - 99%)    I&O's Detail      Daily     Daily Weight in k (11 Aug 2018 07:33)      PHYSICAL EXAM:    Constitutional: NAD, awake, well-developed, comfortable in hospital bed.  HEENT: PERR, EOMI,  No oral cyananosis.  Neck:  supple,  No JVD  Respiratory: Breath sounds are clear bilaterally  Cardiovascular: S1 and S2, IR, IR  Gastrointestinal: Bowel Sounds present, soft,  Extremities: No peripheral edema. No clubbing or cyanosis.  Vascular: 1+ peripheral pulses  Musculoskeletal: no calf tenderness.  Skin: No rashes.      LABS: All Labs Reviewed:                                   12.2   6.55  )-----------( 145      ( 10 Aug 2018 06:27 )             38.3     08-10    140  |  100  |  24<H>  ----------------------------<  94  3.9   |  30  |  0.54    Ca    8.9      10 Aug 2018 06:27      < from: Transthoracic Echocardiogram (18 @ 11:05) >   Summary     Normal appearing mitral valve structure and function.   Moderate (2+) mitral regurgitation is present.   Mild mitral annular calcification is present.   Fibrocalcific changes noted to the Aortic valve leaflets with preserved   leaflet excursion.   Normal appearing tricuspid valve structure and function.   Moderate (2+) tricuspid valve regurgitation is present.   Normal appearing pulmonic valve structure and function.   The left atrium is moderately dilated.   The leftventricle cavity is mildly dilated.   Mild concentric left ventricular hypertrophy is present.   Estimated left ventricular ejection fraction is 20-25 %.   The right atrium appears mildly dilated.   A device wire is seen in the RV and RA.   The IVC is dilated with decreased respiratory variation   Pleural effusion - is present.    < end of copied text >
PCP:    REQUESTING PHYSICIAN:    REASON FOR CONSULT:    CHIEF COMPLAINT:    HPI:  89 y/o male with a PMHx of A. priti on Eliquis w/ PPM, hx of CVA, DM, CAD, CHF and other comorbidities brought from [facility] for c/o chest pain in the lower central chest radiating to b/l arms. Also found to have temp of 102.8 in ER last night.   No active chest pain at this time.   No ABX given in ER. (09 Aug 2018 08:47)  Cardiology evaluation requested. He continues to complain of pain despite morphine IV. Paced rhythm  8/10/18: Pt feels improved. No arrythmia. PM interrogated      PAST MEDICAL & SURGICAL HISTORY:  Pacemaker  Afib  Diabetes  MI (myocardial infarction)  BPH (benign prostatic hyperplasia)  Hypertension  ACS (acute coronary syndrome)  CAD (coronary artery disease)      SOCIAL HISTORY:    FAMILY HISTORY:  No pertinent family history in first degree relatives      ALLERGIES:  Allergies    No Known Allergies    Intolerances        MEDICATIONS:    MEDICATIONS  (STANDING):  apixaban 2.5 milliGRAM(s) Oral every 12 hours  aspirin enteric coated 81 milliGRAM(s) Oral daily  cefTRIAXone Injectable. 1000 milliGRAM(s) IV Push every 24 hours  cefTRIAXone Injectable.      dextrose 5%. 1000 milliLiter(s) (50 mL/Hr) IV Continuous <Continuous>  dextrose 50% Injectable 12.5 Gram(s) IV Push once  dextrose 50% Injectable 25 Gram(s) IV Push once  dextrose 50% Injectable 25 Gram(s) IV Push once  digoxin     Tablet 0.125 milliGRAM(s) Oral daily  docusate sodium 100 milliGRAM(s) Oral three times a day  doxazosin 2 milliGRAM(s) Oral at bedtime  enalapril 2.5 milliGRAM(s) Oral two times a day  escitalopram 10 milliGRAM(s) Oral daily  famotidine    Tablet 20 milliGRAM(s) Oral daily  furosemide    Tablet 60 milliGRAM(s) Oral daily  gabapentin 300 milliGRAM(s) Oral three times a day  insulin lispro (HumaLOG) corrective regimen sliding scale   SubCutaneous three times a day before meals  insulin lispro (HumaLOG) corrective regimen sliding scale   SubCutaneous at bedtime  metoprolol tartrate 12.5 milliGRAM(s) Oral two times a day    MEDICATIONS  (PRN):  acetaminophen   Tablet 650 milliGRAM(s) Oral every 6 hours PRN For Temp greater than 38 C (100.4 F)  aluminum hydroxide/magnesium hydroxide/simethicone Suspension 30 milliLiter(s) Oral every 6 hours PRN Dyspepsia  dextrose 40% Gel 15 Gram(s) Oral once PRN Blood Glucose LESS THAN 70 milliGRAM(s)/deciliter  glucagon  Injectable 1 milliGRAM(s) IntraMuscular once PRN Glucose LESS THAN 70 milligrams/deciliter  magnesium hydroxide Suspension 30 milliLiter(s) Oral daily PRN Constipation        Vital Signs Last 24 Hrs  T(C): 36.7 (10 Aug 2018 05:35), Max: 37.2 (09 Aug 2018 14:20)  T(F): 98 (10 Aug 2018 05:35), Max: 99 (09 Aug 2018 14:20)  HR: 77 (10 Aug 2018 05:35) (68 - 82)  BP: 94/63 (10 Aug 2018 05:35) (94/63 - 118/49)  BP(mean): --  RR: 18 (10 Aug 2018 05:35) (18 - 22)  SpO2: 98% (10 Aug 2018 05:35) (95% - 100%)Daily     Daily Weight in k.9 (09 Aug 2018 14:20)I&O's Summary      PHYSICAL EXAM:    Constitutional: NAD, awake and alert, well-developed  HEENT: PERR, EOMI,  No oral cyananosis.  Neck:  supple,  No JVD  Respiratory: Breath sounds are clear bilaterally, No wheezing, rales or rhonchi  Cardiovascular: S1 and S2, regular rate and rhythm, no Murmurs, gallops or rubs  Gastrointestinal: Bowel Sounds present, soft, nontender.   Extremities: No peripheral edema. No clubbing or cyanosis.  Vascular: 2+ peripheral pulses  Neurological: tremulous   Musculoskeletal: no calf tenderness.  Skin: No rashes.      LABS: All Labs Reviewed:                        12.2   6.55  )-----------( 145      ( 10 Aug 2018 06:27 )             38.3                         12.1   7.39  )-----------( 144      ( 09 Aug 2018 12:32 )             36.3                         12.4   11.39 )-----------( 152      ( 08 Aug 2018 21:44 )             38.0     10 Aug 2018 06:27    140    |  100    |  24     ----------------------------<  94     3.9     |  30     |  0.54   09 Aug 2018 12:32    140    |  99     |  21     ----------------------------<  105    3.8     |  32     |  0.65   08 Aug 2018 21:44    136    |  97     |  22     ----------------------------<  140    4.2     |  32     |  0.77     Ca    8.9        10 Aug 2018 06:27  Ca    8.5        09 Aug 2018 12:32  Ca    8.8        08 Aug 2018 21:44    TPro  7.1    /  Alb  3.1    /  TBili  1.3    /  DBili  x      /  AST  19     /  ALT  12     /  AlkPhos  62     08 Aug 2018 21:44    PT/INR - ( 08 Aug 2018 21:44 )   PT: 16.7 sec;   INR: 1.53 ratio         PTT - ( 08 Aug 2018 21:44 )  PTT:33.0 sec  CARDIAC MARKERS ( 09 Aug 2018 04:32 )  0.043 ng/mL / x     / x     / x     / x      CARDIAC MARKERS ( 09 Aug 2018 01:07 )  0.053 ng/mL / x     / x     / x     / x      CARDIAC MARKERS ( 08 Aug 2018 21:44 )  0.043 ng/mL / x     / x     / x     / x          Blood Culture:         RADIOLOGY/EKG:      ECHO/CARDIAC CATHTERIZATION/STRESS TEST:
c/c: chest pain    HPI: 87 y/o male with a PMHx of SPENSER marcos on Eliquis w/ PPM, hx of CVA, DM, CAD, CHF and other comorbidities brought from [facility] for c/o chest pain in the lower central chest radiating to b/l arms. Also found to have temp of 102.8 in ER last night.   No active chest pain at this time.   No ABX given in ER.     8/10: pt more awake and alert today; less febrile, 100.    : pt c/o cough and phlegm at times ; no fever today    : no cough today  urine E fecalis sx to cipro      PHYSICAL EXAM:    Daily     Daily Weight in k.3 (12 Aug 2018 07:19)    ICU Vital Signs Last 24 Hrs  T(C): 36.5 (12 Aug 2018 15:45), Max: 36.8 (12 Aug 2018 06:38)  T(F): 97.7 (12 Aug 2018 15:45), Max: 98.2 (12 Aug 2018 06:38)  HR: 70 (12 Aug 2018 15:45) (70 - 71)  BP: 101/45 (12 Aug 2018 15:45) (101/45 - 118/50)  BP(mean): --  ABP: --  ABP(mean): --  RR: 16 (12 Aug 2018 15:45) (16 - 17)  SpO2: 100% (12 Aug 2018 15:45) (98% - 100%)          Constitutional: Weak appearing  HEENT: Atraumatic,   Respiratory: Breath Sounds normal, no rhonchi/wheeze  Cardiovascular: N S1S2;   Gastrointestinal: Abdomen soft, non tender, Bowel Sounds present  Extremities: No edema, peripheral pulses present  Neurological: AAO x 1, no gross focal motor deficits  Skin: Non cellulitic, no rash, ulcers                                    MEDICATIONS  (STANDING):  apixaban 2.5 milliGRAM(s) Oral every 12 hours  aspirin enteric coated 81 milliGRAM(s) Oral daily  ciprofloxacin   IVPB 400 milliGRAM(s) IV Intermittent every 12 hours  dextrose 5%. 1000 milliLiter(s) (50 mL/Hr) IV Continuous <Continuous>  dextrose 50% Injectable 12.5 Gram(s) IV Push once  dextrose 50% Injectable 25 Gram(s) IV Push once  dextrose 50% Injectable 25 Gram(s) IV Push once  digoxin     Tablet 0.125 milliGRAM(s) Oral daily  docusate sodium 100 milliGRAM(s) Oral three times a day  doxazosin 2 milliGRAM(s) Oral at bedtime  enalapril 2.5 milliGRAM(s) Oral two times a day  escitalopram 10 milliGRAM(s) Oral daily  famotidine    Tablet 20 milliGRAM(s) Oral daily  furosemide    Tablet 60 milliGRAM(s) Oral daily  gabapentin 300 milliGRAM(s) Oral three times a day  insulin lispro (HumaLOG) corrective regimen sliding scale   SubCutaneous three times a day before meals  insulin lispro (HumaLOG) corrective regimen sliding scale   SubCutaneous at bedtime  lactobacillus acidophilus 1 Tablet(s) Oral daily  metoprolol succinate ER 25 milliGRAM(s) Oral daily  multivitamin/minerals 1 Tablet(s) Oral daily  ranolazine 500 milliGRAM(s) Oral two times a day
c/c: chest pain    HPI: 87 y/o male with a PMHx of SPENSER marcos on Eliquis w/ PPM, hx of CVA, DM, CAD, CHF and other comorbidities brought from [facility] for c/o chest pain in the lower central chest radiating to b/l arms. Also found to have temp of 102.8 in ER last night.   No active chest pain at this time.   No ABX given in ER.     8/10: pt more awake and alert today; less febrile, 100.    : pt c/o cough and phlegm at times ; no fever today    : no cough today  urine E fecalis sx to cipro    : ABX changed to augmentin as per ID      PHYSICAL EXAM:    Daily     Daily Weight in k.6 (13 Aug 2018 10:06)    ICU Vital Signs Last 24 Hrs  T(C): 36.3 (13 Aug 2018 16:02), Max: 36.6 (12 Aug 2018 20:52)  T(F): 97.4 (13 Aug 2018 16:02), Max: 97.8 (12 Aug 2018 20:52)  HR: 70 (13 Aug 2018 16:02) (70 - 70)  BP: 110/42 (13 Aug 2018 16:02) (106/50 - 111/57)  BP(mean): --  ABP: --  ABP(mean): --  RR: 17 (13 Aug 2018 16:02) (17 - 18)  SpO2: 100% (13 Aug 2018 16:02) (99% - 100%)    Constitutional: Weak appearing  HEENT: Atraumatic,   Respiratory: Breath Sounds normal, no rhonchi/wheeze  Cardiovascular: N S1S2;   Gastrointestinal: Abdomen soft, non tender, Bowel Sounds present  Extremities: No edema, peripheral pulses present  Neurological: AAO x 1, no gross focal motor deficits  Skin: Non cellulitic, no rash, ulcers                          11.9   6.82  )-----------( 148      ( 13 Aug 2018 05:45 )             37.2       CBC Full  -  ( 13 Aug 2018 05:45 )  WBC Count : 6.82 K/uL  Hemoglobin : 11.9 g/dL  Hematocrit : 37.2 %  Platelet Count - Automated : 148 K/uL  Mean Cell Volume : 84.4 fl  Mean Cell Hemoglobin : 27.0 pg  Mean Cell Hemoglobin Concentration : 32.0 gm/dL  Auto Neutrophil # : x  Auto Lymphocyte # : x  Auto Monocyte # : x  Auto Eosinophil # : x  Auto Basophil # : x  Auto Neutrophil % : x  Auto Lymphocyte % : x  Auto Monocyte % : x  Auto Eosinophil % : x  Auto Basophil % : x          139  |  98  |  18  ----------------------------<  109<H>  4.0   |  34<H>  |  0.58    Ca    8.2<L>      13 Aug 2018 05:45  Phos  3.2     08-12  Mg     2.0     08-12                CARDIAC MARKERS ( 13 Aug 2018 13:15 )  0.029 ng/mL / x     / x     / x     / x      CARDIAC MARKERS ( 13 Aug 2018 10:38 )  0.032 ng/mL / x     / x     / x     / x      CARDIAC MARKERS ( 12 Aug 2018 19:54 )  0.032 ng/mL / x     / x     / x     / x                    MEDICATIONS  (STANDING):  amoxicillin  875 milliGRAM(s)/clavulanate 1 Tablet(s) Oral every 12 hours  apixaban 2.5 milliGRAM(s) Oral every 12 hours  aspirin enteric coated 81 milliGRAM(s) Oral daily  dextrose 5%. 1000 milliLiter(s) (50 mL/Hr) IV Continuous <Continuous>  dextrose 50% Injectable 12.5 Gram(s) IV Push once  dextrose 50% Injectable 25 Gram(s) IV Push once  dextrose 50% Injectable 25 Gram(s) IV Push once  digoxin     Tablet 0.125 milliGRAM(s) Oral daily  docusate sodium 100 milliGRAM(s) Oral three times a day  doxazosin 2 milliGRAM(s) Oral at bedtime  enalapril 2.5 milliGRAM(s) Oral two times a day  escitalopram 10 milliGRAM(s) Oral daily  famotidine    Tablet 20 milliGRAM(s) Oral daily  furosemide    Tablet 60 milliGRAM(s) Oral daily  gabapentin 300 milliGRAM(s) Oral three times a day  insulin lispro (HumaLOG) corrective regimen sliding scale   SubCutaneous three times a day before meals  insulin lispro (HumaLOG) corrective regimen sliding scale   SubCutaneous at bedtime  lactobacillus acidophilus 1 Tablet(s) Oral daily  metoprolol succinate ER 25 milliGRAM(s) Oral daily  multivitamin/minerals 1 Tablet(s) Oral daily  polyethylene glycol 3350 17 Gram(s) Oral daily  ranolazine 500 milliGRAM(s) Oral two times a day  senna 2 Tablet(s) Oral at bedtime
c/c: chest pain    HPI: 89 y/o male with a PMHx of SPENSER marcos on Eliquis w/ PPM, hx of CVA, DM, CAD, CHF and other comorbidities brought from [facility] for c/o chest pain in the lower central chest radiating to b/l arms. Also found to have temp of 102.8 in ER last night.   No active chest pain at this time.   No ABX given in ER.     8/10: pt more awake and alert today; less febrile, 100.       PHYSICAL EXAM:    Daily     Daily Weight in k.2 (10 Aug 2018 09:17)    ICU Vital Signs Last 24 Hrs  T(C): 37.8 (10 Aug 2018 10:58), Max: 37.8 (10 Aug 2018 10:58)  T(F): 100 (10 Aug 2018 10:58), Max: 100 (10 Aug 2018 10:58)  HR: 70 (10 Aug 2018 09:17) (68 - 77)  BP: 112/56 (10 Aug 2018 09:17) (94/63 - 115/61)  BP(mean): --  ABP: --  ABP(mean): --  RR: 18 (10 Aug 2018 09:17) (18 - 18)  SpO2: 96% (10 Aug 2018 09:17) (96% - 100%)      Constitutional: Weak appearing  HEENT: Atraumatic,   Respiratory: Breath Sounds normal, no rhonchi/wheeze  Cardiovascular: N S1S2;   Gastrointestinal: Abdomen soft, non tender, Bowel Sounds present  Extremities: No edema, peripheral pulses present  Neurological: AAO x 1, no gross focal motor deficits  Skin: Non cellulitic, no rash, ulcers                          12.2   6.55  )-----------( 145      ( 10 Aug 2018 06:27 )             38.3       CBC Full  -  ( 10 Aug 2018 06:27 )  WBC Count : 6.55 K/uL  Hemoglobin : 12.2 g/dL  Hematocrit : 38.3 %  Platelet Count - Automated : 145 K/uL  Mean Cell Volume : 84.4 fl  Mean Cell Hemoglobin : 26.9 pg  Mean Cell Hemoglobin Concentration : 31.9 gm/dL  Auto Neutrophil # : x  Auto Lymphocyte # : x  Auto Monocyte # : x  Auto Eosinophil # : x  Auto Basophil # : x  Auto Neutrophil % : x  Auto Lymphocyte % : x  Auto Monocyte % : x  Auto Eosinophil % : x  Auto Basophil % : x      08-10    140  |  100  |  24<H>  ----------------------------<  94  3.9   |  30  |  0.54    Ca    8.9      10 Aug 2018 06:27    TPro  7.1  /  Alb  3.1<L>  /  TBili  1.3<H>  /  DBili  x   /  AST  19  /  ALT  12  /  AlkPhos  62  08-08      LIVER FUNCTIONS - ( 08 Aug 2018 21:44 )  Alb: 3.1 g/dL / Pro: 7.1 gm/dL / ALK PHOS: 62 U/L / ALT: 12 U/L / AST: 19 U/L / GGT: x             PT/INR - ( 08 Aug 2018 21:44 )   PT: 16.7 sec;   INR: 1.53 ratio         PTT - ( 08 Aug 2018 21:44 )  PTT:33.0 sec    CARDIAC MARKERS ( 09 Aug 2018 04:32 )  0.043 ng/mL / x     / x     / x     / x      CARDIAC MARKERS ( 09 Aug 2018 01:07 )  0.053 ng/mL / x     / x     / x     / x      CARDIAC MARKERS ( 08 Aug 2018 21:44 )  0.043 ng/mL / x     / x     / x     / x            Urinalysis Basic - ( 09 Aug 2018 06:56 )    Color: Yellow / Appearance: very cloudy / S.020 / pH: x  Gluc: x / Ketone: Trace  / Bili: Negative / Urobili: 4 mg/dL   Blood: x / Protein: 100 mg/dL / Nitrite: Negative   Leuk Esterase: Moderate / RBC: 3-5 /HPF / WBC >50   Sq Epi: x / Non Sq Epi: Few / Bacteria: Few            MEDICATIONS  (STANDING):  apixaban 2.5 milliGRAM(s) Oral every 12 hours  aspirin enteric coated 81 milliGRAM(s) Oral daily  cefTRIAXone Injectable. 1000 milliGRAM(s) IV Push every 24 hours  cefTRIAXone Injectable.      dextrose 5%. 1000 milliLiter(s) (50 mL/Hr) IV Continuous <Continuous>  dextrose 50% Injectable 12.5 Gram(s) IV Push once  dextrose 50% Injectable 25 Gram(s) IV Push once  dextrose 50% Injectable 25 Gram(s) IV Push once  digoxin     Tablet 0.125 milliGRAM(s) Oral daily  docusate sodium 100 milliGRAM(s) Oral three times a day  doxazosin 2 milliGRAM(s) Oral at bedtime  enalapril 2.5 milliGRAM(s) Oral two times a day  escitalopram 10 milliGRAM(s) Oral daily  famotidine    Tablet 20 milliGRAM(s) Oral daily  furosemide    Tablet 60 milliGRAM(s) Oral daily  gabapentin 300 milliGRAM(s) Oral three times a day  insulin lispro (HumaLOG) corrective regimen sliding scale   SubCutaneous three times a day before meals  insulin lispro (HumaLOG) corrective regimen sliding scale   SubCutaneous at bedtime  metoprolol tartrate 12.5 milliGRAM(s) Oral two times a day  multivitamin 1 Tablet(s) Oral daily
c/c: chest pain    HPI: 87 y/o male with a PMHx of SPENSER marcos on Eliquis w/ PPM, hx of CVA, DM, CAD, CHF and other comorbidities brought from [facility] for c/o chest pain in the lower central chest radiating to b/l arms. Also found to have temp of 102.8 in ER last night.   No active chest pain at this time.   No ABX given in ER.     8/10: pt more awake and alert today; less febrile, 100.    : pt c/o cough and phlegm at times ; no fever today      PHYSICAL EXAM:    Daily     Daily Weight in k (11 Aug 2018 07:33)    ICU Vital Signs Last 24 Hrs  T(C): 36.3 (11 Aug 2018 09:55), Max: 37.2 (10 Aug 2018 20:59)  T(F): 97.4 (11 Aug 2018 09:55), Max: 99 (10 Aug 2018 20:59)  HR: 70 (11 Aug 2018 09:55) (67 - 86)  BP: 101/74 (11 Aug 2018 09:55) (101/74 - 129/53)  BP(mean): --  ABP: --  ABP(mean): --  RR: 18 (11 Aug 2018 09:55) (18 - 18)  SpO2: 99% (11 Aug 2018 09:55) (98% - 99%)      Constitutional: Weak appearing  HEENT: Atraumatic,   Respiratory: Breath Sounds normal, no rhonchi/wheeze  Cardiovascular: N S1S2;   Gastrointestinal: Abdomen soft, non tender, Bowel Sounds present  Extremities: No edema, peripheral pulses present  Neurological: AAO x 1, no gross focal motor deficits  Skin: Non cellulitic, no rash, ulcers                          12.2   6.55  )-----------( 145      ( 10 Aug 2018 06:27 )             38.3       CBC Full  -  ( 10 Aug 2018 06:27 )  WBC Count : 6.55 K/uL  Hemoglobin : 12.2 g/dL  Hematocrit : 38.3 %  Platelet Count - Automated : 145 K/uL  Mean Cell Volume : 84.4 fl  Mean Cell Hemoglobin : 26.9 pg  Mean Cell Hemoglobin Concentration : 31.9 gm/dL  Auto Neutrophil # : x  Auto Lymphocyte # : x  Auto Monocyte # : x  Auto Eosinophil # : x  Auto Basophil # : x  Auto Neutrophil % : x  Auto Lymphocyte % : x  Auto Monocyte % : x  Auto Eosinophil % : x  Auto Basophil % : x      08-10    140  |  100  |  24<H>  ----------------------------<  94  3.9   |  30  |  0.54    Ca    8.9      10 Aug 2018 06:27                              MEDICATIONS  (STANDING):  apixaban 2.5 milliGRAM(s) Oral every 12 hours  aspirin enteric coated 81 milliGRAM(s) Oral daily  cefTRIAXone Injectable. 1000 milliGRAM(s) IV Push every 24 hours  cefTRIAXone Injectable.      dextrose 5%. 1000 milliLiter(s) (50 mL/Hr) IV Continuous <Continuous>  dextrose 50% Injectable 12.5 Gram(s) IV Push once  dextrose 50% Injectable 25 Gram(s) IV Push once  dextrose 50% Injectable 25 Gram(s) IV Push once  digoxin     Tablet 0.125 milliGRAM(s) Oral daily  docusate sodium 100 milliGRAM(s) Oral three times a day  doxazosin 2 milliGRAM(s) Oral at bedtime  enalapril 2.5 milliGRAM(s) Oral two times a day  escitalopram 10 milliGRAM(s) Oral daily  famotidine    Tablet 20 milliGRAM(s) Oral daily  furosemide    Tablet 60 milliGRAM(s) Oral daily  gabapentin 300 milliGRAM(s) Oral three times a day  insulin lispro (HumaLOG) corrective regimen sliding scale   SubCutaneous three times a day before meals  insulin lispro (HumaLOG) corrective regimen sliding scale   SubCutaneous at bedtime  lactobacillus acidophilus 1 Tablet(s) Oral daily  metoprolol tartrate 12.5 milliGRAM(s) Oral two times a day  multivitamin/minerals 1 Tablet(s) Oral daily
HPI:  87 y/o male with a PMHx of A. priti on Eliquis w/ PPM, hx of CVA, DM, CAD, CHF and other comorbidities brought from [facility] for c/o chest pain in the lower central chest radiating to b/l arms. Also found to have temp of 102.8 in ER last night.   No active chest pain at this time.   No ABX given in ER. (09 Aug 2018 08:47)  Cardiology evaluation requested. He continues to complain of pain despite morphine IV. Paced rhythm  8/10/18: Pt feels improved. No arrythmia. PM interrogated  18: No new complaints.  breathing well.  Tired today as he did not sleep well.   18: energy level slightly improved.  No CP or SOB.      PAST MEDICAL & SURGICAL HISTORY:  Pacemaker  Afib  Diabetes  MI (myocardial infarction)  BPH (benign prostatic hyperplasia)  Hypertension  ACS (acute coronary syndrome)  CAD (coronary artery disease)    FAMILY HISTORY:  No pertinent family history in first degree relatives      ALLERGIES:  Allergies    No Known Allergies    Intolerances    MEDICATIONS  (STANDING):  apixaban 2.5 milliGRAM(s) Oral every 12 hours  aspirin enteric coated 81 milliGRAM(s) Oral daily  ciprofloxacin   IVPB 400 milliGRAM(s) IV Intermittent every 12 hours  dextrose 5%. 1000 milliLiter(s) (50 mL/Hr) IV Continuous <Continuous>  dextrose 50% Injectable 12.5 Gram(s) IV Push once  dextrose 50% Injectable 25 Gram(s) IV Push once  dextrose 50% Injectable 25 Gram(s) IV Push once  digoxin     Tablet 0.125 milliGRAM(s) Oral daily  docusate sodium 100 milliGRAM(s) Oral three times a day  doxazosin 2 milliGRAM(s) Oral at bedtime  enalapril 2.5 milliGRAM(s) Oral two times a day  escitalopram 10 milliGRAM(s) Oral daily  famotidine    Tablet 20 milliGRAM(s) Oral daily  furosemide    Tablet 60 milliGRAM(s) Oral daily  gabapentin 300 milliGRAM(s) Oral three times a day  insulin lispro (HumaLOG) corrective regimen sliding scale   SubCutaneous three times a day before meals  insulin lispro (HumaLOG) corrective regimen sliding scale   SubCutaneous at bedtime  lactobacillus acidophilus 1 Tablet(s) Oral daily  metoprolol succinate ER 25 milliGRAM(s) Oral daily  multivitamin/minerals 1 Tablet(s) Oral daily  ranolazine 500 milliGRAM(s) Oral two times a day    MEDICATIONS  (PRN):  acetaminophen   Tablet 650 milliGRAM(s) Oral every 6 hours PRN For Temp greater than 38 C (100.4 F)  aluminum hydroxide/magnesium hydroxide/simethicone Suspension 30 milliLiter(s) Oral every 6 hours PRN Dyspepsia  dextrose 40% Gel 15 Gram(s) Oral once PRN Blood Glucose LESS THAN 70 milliGRAM(s)/deciliter  glucagon  Injectable 1 milliGRAM(s) IntraMuscular once PRN Glucose LESS THAN 70 milligrams/deciliter  guaiFENesin    Syrup 200 milliGRAM(s) Oral every 6 hours PRN Cough  magnesium hydroxide Suspension 30 milliLiter(s) Oral daily PRN Constipation      Vital Signs Last 24 Hrs  T(C): 36.1 (12 Aug 2018 10:00), Max: 36.8 (12 Aug 2018 06:38)  T(F): 97 (12 Aug 2018 10:00), Max: 98.2 (12 Aug 2018 06:38)  HR: 70 (12 Aug 2018 10:00) (70 - 71)  BP: 105/44 (12 Aug 2018 10:00) (103/51 - 118/50)  BP(mean): --  RR: 17 (12 Aug 2018 10:00) (16 - 17)  SpO2: 99% (12 Aug 2018 10:00) (98% - 100%)    I&O's Detail      Daily     Daily Weight in k.3 (12 Aug 2018 07:19)        PHYSICAL EXAM:    Constitutional: NAD, awake, well-developed, comfortable in hospital bed.  HEENT: PERR, EOMI,  No oral cyananosis.  Neck:  supple,  No JVD  Respiratory: Breath sounds are clear bilaterally  Cardiovascular: S1 and S2, IR, IR  Gastrointestinal: Bowel Sounds present, soft,  Extremities: No peripheral edema. No clubbing or cyanosis.  Vascular: 1+ peripheral pulses  Musculoskeletal: no calf tenderness.  Skin: No rashes.      LABS: All Labs Reviewed:             Complete Blood Count in AM (08.10.18 @ 06:27)    Nucleated RBC: 0 /100 WBCs    WBC Count: 6.55 K/uL    RBC Count: 4.54 M/uL    Hemoglobin: 12.2 g/dL    Hematocrit: 38.3 %    Mean Cell Volume: 84.4 fl    Mean Cell Hemoglobin: 26.9 pg    Mean Cell Hemoglobin Conc: 31.9 gm/dL    Red Cell Distrib Width: 17.7 %    Platelet Count - Automated: 145 K/uL    Basic Metabolic Panel in AM (08.10.18 @ 06:27)    Sodium, Serum: 140 mmol/L    Potassium, Serum: 3.9 mmol/L    Chloride, Serum: 100 mmol/L    Carbon Dioxide, Serum: 30 mmol/L    Anion Gap, Serum: 10 mmol/L    Blood Urea Nitrogen, Serum: 24 mg/dL    Creatinine, Serum: 0.54 mg/dL    Glucose, Serum: 94 mg/dL    Calcium, Total Serum: 8.9 mg/dL            < from: Transthoracic Echocardiogram (18 @ 11:05) >   Summary     Normal appearing mitral valve structure and function.   Moderate (2+) mitral regurgitation is present.   Mild mitral annular calcification is present.   Fibrocalcific changes noted to the Aortic valve leaflets with preserved   leaflet excursion.   Normal appearing tricuspid valve structure and function.   Moderate (2+) tricuspid valve regurgitation is present.   Normal appearing pulmonic valve structure and function.   The left atrium is moderately dilated.   The leftventricle cavity is mildly dilated.   Mild concentric left ventricular hypertrophy is present.   Estimated left ventricular ejection fraction is 20-25 %.   The right atrium appears mildly dilated.   A device wire is seen in the RV and RA.   The IVC is dilated with decreased respiratory variation   Pleural effusion - is present.    < end of copied text >

## 2018-08-22 DIAGNOSIS — I48.91 UNSPECIFIED ATRIAL FIBRILLATION: ICD-10-CM

## 2018-08-22 DIAGNOSIS — Z95.0 PRESENCE OF CARDIAC PACEMAKER: ICD-10-CM

## 2018-08-22 DIAGNOSIS — Z86.73 PERSONAL HISTORY OF TRANSIENT ISCHEMIC ATTACK (TIA), AND CEREBRAL INFARCTION WITHOUT RESIDUAL DEFICITS: ICD-10-CM

## 2018-08-22 DIAGNOSIS — E11.9 TYPE 2 DIABETES MELLITUS WITHOUT COMPLICATIONS: ICD-10-CM

## 2018-08-22 DIAGNOSIS — E43 UNSPECIFIED SEVERE PROTEIN-CALORIE MALNUTRITION: ICD-10-CM

## 2018-08-22 DIAGNOSIS — N39.0 URINARY TRACT INFECTION, SITE NOT SPECIFIED: ICD-10-CM

## 2018-08-22 DIAGNOSIS — I11.0 HYPERTENSIVE HEART DISEASE WITH HEART FAILURE: ICD-10-CM

## 2018-08-22 DIAGNOSIS — I50.22 CHRONIC SYSTOLIC (CONGESTIVE) HEART FAILURE: ICD-10-CM

## 2018-08-22 DIAGNOSIS — Z79.4 LONG TERM (CURRENT) USE OF INSULIN: ICD-10-CM

## 2018-08-22 DIAGNOSIS — I25.2 OLD MYOCARDIAL INFARCTION: ICD-10-CM

## 2018-08-22 DIAGNOSIS — K21.9 GASTRO-ESOPHAGEAL REFLUX DISEASE WITHOUT ESOPHAGITIS: ICD-10-CM

## 2018-08-22 DIAGNOSIS — Z79.01 LONG TERM (CURRENT) USE OF ANTICOAGULANTS: ICD-10-CM

## 2018-08-22 DIAGNOSIS — Z79.82 LONG TERM (CURRENT) USE OF ASPIRIN: ICD-10-CM

## 2018-08-22 DIAGNOSIS — I25.10 ATHEROSCLEROTIC HEART DISEASE OF NATIVE CORONARY ARTERY WITHOUT ANGINA PECTORIS: ICD-10-CM

## 2018-08-22 DIAGNOSIS — N40.0 BENIGN PROSTATIC HYPERPLASIA WITHOUT LOWER URINARY TRACT SYMPTOMS: ICD-10-CM

## 2018-08-22 DIAGNOSIS — B95.2 ENTEROCOCCUS AS THE CAUSE OF DISEASES CLASSIFIED ELSEWHERE: ICD-10-CM

## 2018-09-14 ENCOUNTER — APPOINTMENT (OUTPATIENT)
Dept: ELECTROPHYSIOLOGY | Facility: CLINIC | Age: 83
End: 2018-09-14

## 2019-11-05 NOTE — DISCHARGE NOTE ADULT - DISCHARGE TO
Bill For Surgical Tray: no
Expected Date Of Service: 11/05/2019
Lab Facility: [id number]
Performing Laboratory: [id number]
Billing Type: Third-Party Bill
[facility]

## 2021-01-01 NOTE — SWALLOW BEDSIDE ASSESSMENT ADULT - SWALLOW EVAL: CURRENT DIET
Diet suggestion by speech pathology is pending a swallowing consult at this time.
(1) body pink, extremities blue

## 2021-04-22 NOTE — DISCHARGE NOTE ADULT - PRINCIPAL DIAGNOSIS
Pt advised to f/u with referring physician Altered mental status, unspecified altered mental status type
